# Patient Record
Sex: MALE | Race: WHITE | NOT HISPANIC OR LATINO | Employment: OTHER | URBAN - METROPOLITAN AREA
[De-identification: names, ages, dates, MRNs, and addresses within clinical notes are randomized per-mention and may not be internally consistent; named-entity substitution may affect disease eponyms.]

---

## 2017-09-25 ENCOUNTER — ALLSCRIPTS OFFICE VISIT (OUTPATIENT)
Dept: OTHER | Facility: OTHER | Age: 72
End: 2017-09-25

## 2017-10-27 NOTE — CONSULTS
Assessment  1  Colon cancer screening (V76 51) (Z12 11)   2  Chronic GERD (530 81) (K21 9)    Plan  Chronic GERD    · Suprep Bowel Prep Kit 17 5-3 13-1 6 GM/180ML Oral Solution; DILUTE CONTENTS  AND USE AS DIRECTED FOR BOWEL PREP   Rx By: Robles Saez; Dispense: 0 Days ; #:1 ML; Refill: 0;For: Chronic GERD; MIKI = N; Verified Transmission to [address] [place] [address] [address]; Last Updated By: System, [employer]; 9/25/2017 1:30:58 PM   · EGD; Status:Hold For - Scheduling; Requested VGM:[id number];    Perform:[facility]; Due:[id number];Ordered; For:Chronic GERD; Ordered By:Girish Duran;  Colon cancer screening    · COLONOSCOPY (GI, SURG); Status:Hold For - Scheduling; Requested TMN:[id number];    Perform:[facility]; Due:[id number]; Ordered; For:Colon cancer screening; Ordered By:Girish Duran;    Discussion/Summary  Discussion Summary:   1  History of GERD on omeprazole 20 mg, unsure if patient has history of Lang's or not, was referred for EGD by [facility]  no alarm symptoms  History of colon polyps: no alarm symptoms, we'll plan for repeat colonoscopy  Patient was explained about the risks and benefits of the procedure  Risks including but not limited to bleeding, infection, perforation were explained in detail  Also explained about less than 100% sensitivity with the exam and other alternatives  Chief Complaint  Chief Complaint Free Text Note Form: EGD and colonoscopy consult      History of Present Illness  HPI: This is a 23-year-old male with history of hypertension, GERD on omeprazole 20 mg who was referred from [facility] For EGD and colonoscopy  Patient states that he was recommended to have a repeat EGD and colonoscopy at 3 year interval  His loss procedures were 2014  He recalls having had history of polyps, does not recall if he has Lang's or not  He denies dysphagia, odynophagia, hematemesis, melena, hematochezia, unintentional weight loss or abdominal pain  He denies NSAID use   His only other medication is amlodipine  Review of Systems  Complete-Male GI Adult:   Constitutional: No fever or chills, feels well, no tiredness, no recent weight gain or weight loss  Eyes: No complaints of eye pain, no red eyes, no discharge from eyes, no itchy eyes  ENT: no complaints of earache, no hearing loss, no nosebleeds, no nasal discharge, no sore throat, no hoarseness  Cardiovascular: No complaints of slow heart rate, no fast heart rate, no chest pain, no palpitations, no leg claudication, no lower extremity  Respiratory: No complaints of shortness of breath, no wheezing, no cough, no SOB on exertion, no orthopnea or PND  Gastrointestinal: as noted in HPI  Genitourinary: No complaints of dysuria, no incontinence, no hesitancy, no nocturia, no genital lesion, no testicular pain  Musculoskeletal: No complaints of arthralgia, no myalgias, no joint swelling or stiffness, no limb pain or swelling  Integumentary: No complaints of skin rash or skin lesions, no itching, no skin wound, no dry skin  Neurological: No compliants of headache, no confusion, no convulsions, no numbness or tingling, no dizziness or fainting, no limb weakness, no difficulty walking  Psychiatric: Is not suicidal, no sleep disturbances, no anxiety or depression, no change in personality, no emotional problems  Endocrine: No complaints of proptosis, no hot flashes, no muscle weakness, no erectile dysfunction, no deepening of the voice, no feelings of weakness  Hematologic/Lymphatic: No complaints of swollen glands, no swollen glands in the neck, does not bleed easily, no easy bruising  ROS Reviewed:   ROS reviewed  Active Problems  1  Chronic GERD (530 81) (K21 9)   2  Colon cancer screening (V76 51) (Z12 11)    Past Medical History  Active Problems And Past Medical History Reviewed: The active problems and past medical history were reviewed and updated today  Surgical History  1   History of Complete Colonoscopy  Surgical History Reviewed: The surgical history was reviewed and updated today  Family History  Mother    1  Denied: Family history of Crohn's disease without complication, unspecified   gastrointestinal tract location   2  Denied: Family history of colon cancer   3  Denied: Family history of liver disease  Father    4  Denied: Family history of Crohn's disease without complication, unspecified   gastrointestinal tract location   5  Denied: Family history of colon cancer   6  Denied: Family history of liver disease  Family History Reviewed: The family history was reviewed and updated today  Social History   · Never smoker   · No alcohol use  Social History Reviewed: The social history was reviewed and updated today  Current Meds   1  AmLODIPine Besylate 5 MG Oral Tablet; Therapy: (Recorded:[id number]) to Recorded   2  Lisinopril 10 MG Oral Tablet; Therapy: (Vasquez Harvey) to Recorded   3  Omeprazole 20 MG Oral Capsule Delayed Release; Therapy: (Recorded:[id number]) to Recorded  Medication List Reviewed: The medication list was reviewed and updated today  Allergies  1  No Known Drug Allergies    Vitals  Vital Signs    Recorded: [id number] 01:24PM   Temperature 96 8 F   Heart Rate 76   Respiration 14   Systolic 148   Diastolic 86   Height 6 ft    Weight 185 lb 8 oz   BMI Calculated 25 16   BSA Calculated 2 06   O2 Saturation 96     Physical Exam    Constitutional   General appearance: No acute distress, well appearing and well nourished  Eyes   Conjunctiva and lids: No swelling, erythema, or discharge  Ears, Nose, Mouth, and Throat   Oropharynx: Normal with no erythema, edema, exudate or lesions  Pulmonary   Respiratory effort: No increased work of breathing or signs of respiratory distress  Auscultation of lungs: Clear to auscultation, equal breath sounds bilaterally, no wheezes, no rales, no rhonci      Cardiovascular   Palpation of heart: Normal PMI, no thrills  Auscultation of heart: Normal rate and rhythm, normal S1 and S2, without murmurs  Examination of extremities for edema and/or varicosities: Normal     Abdomen   Abdomen: Non-tender, no masses  Liver and spleen: No hepatomegaly or splenomegaly  Lymphatic   Palpation of lymph nodes in neck: No lymphadenopathy  Skin   Skin and subcutaneous tissue: Normal without rashes or lesions      Psychiatric   Orientation to person, place and time: Normal     Mood and affect: Normal          Signatures   Electronically signed by : Merlin Lindsay, M D ; Sep 25 2017  1:46PM EST                       (Author)

## 2017-11-14 ENCOUNTER — ANESTHESIA EVENT (OUTPATIENT)
Dept: PERIOP | Facility: AMBULARY SURGERY CENTER | Age: 72
End: 2017-11-14
Payer: OTHER GOVERNMENT

## 2017-11-21 ENCOUNTER — GENERIC CONVERSION - ENCOUNTER (OUTPATIENT)
Dept: OTHER | Facility: OTHER | Age: 72
End: 2017-11-21

## 2017-11-21 ENCOUNTER — HOSPITAL ENCOUNTER (OUTPATIENT)
Facility: AMBULARY SURGERY CENTER | Age: 72
Setting detail: OUTPATIENT SURGERY
Discharge: HOME | End: 2017-11-21
Attending: INTERNAL MEDICINE | Admitting: INTERNAL MEDICINE
Payer: OTHER GOVERNMENT

## 2017-11-21 ENCOUNTER — ANESTHESIA (OUTPATIENT)
Dept: PERIOP | Facility: AMBULARY SURGERY CENTER | Age: 72
End: 2017-11-21
Payer: OTHER GOVERNMENT

## 2017-11-21 VITALS
WEIGHT: 180 LBS | RESPIRATION RATE: 18 BRPM | HEIGHT: 72 IN | DIASTOLIC BLOOD PRESSURE: 85 MMHG | BODY MASS INDEX: 24.38 KG/M2 | SYSTOLIC BLOOD PRESSURE: 173 MMHG | OXYGEN SATURATION: 97 % | HEART RATE: 64 BPM | TEMPERATURE: 96.8 F

## 2017-11-21 DIAGNOSIS — Z12.11 ENCOUNTER FOR SCREENING FOR MALIGNANT NEOPLASM OF COLON: ICD-10-CM

## 2017-11-21 DIAGNOSIS — K21.9 CHRONIC GERD: ICD-10-CM

## 2017-11-21 PROCEDURE — 88342 IMHCHEM/IMCYTCHM 1ST ANTB: CPT | Performed by: INTERNAL MEDICINE

## 2017-11-21 PROCEDURE — 88305 TISSUE EXAM BY PATHOLOGIST: CPT | Performed by: INTERNAL MEDICINE

## 2017-11-21 PROCEDURE — 88341 IMHCHEM/IMCYTCHM EA ADD ANTB: CPT | Performed by: INTERNAL MEDICINE

## 2017-11-21 RX ORDER — AMLODIPINE BESYLATE 5 MG/1
5 TABLET ORAL DAILY
COMMUNITY

## 2017-11-21 RX ORDER — PROPOFOL 10 MG/ML
INJECTION, EMULSION INTRAVENOUS AS NEEDED
Status: DISCONTINUED | OUTPATIENT
Start: 2017-11-21 | End: 2017-11-21 | Stop reason: SURG

## 2017-11-21 RX ORDER — SODIUM CHLORIDE 9 MG/ML
100 INJECTION, SOLUTION INTRAVENOUS CONTINUOUS
Status: DISCONTINUED | OUTPATIENT
Start: 2017-11-21 | End: 2017-11-21 | Stop reason: HOSPADM

## 2017-11-21 RX ORDER — LISINOPRIL 10 MG/1
10 TABLET ORAL DAILY
COMMUNITY

## 2017-11-21 RX ORDER — OMEPRAZOLE 20 MG/1
20 CAPSULE, DELAYED RELEASE ORAL DAILY
COMMUNITY
End: 2021-03-02 | Stop reason: SDDI

## 2017-11-21 RX ORDER — LIDOCAINE HYDROCHLORIDE 10 MG/ML
INJECTION, SOLUTION INFILTRATION; PERINEURAL AS NEEDED
Status: DISCONTINUED | OUTPATIENT
Start: 2017-11-21 | End: 2017-11-21 | Stop reason: SURG

## 2017-11-21 RX ADMIN — PROPOFOL 100 MG: 10 INJECTION, EMULSION INTRAVENOUS at 07:37

## 2017-11-21 RX ADMIN — PROPOFOL 30 MG: 10 INJECTION, EMULSION INTRAVENOUS at 07:38

## 2017-11-21 RX ADMIN — PROPOFOL 30 MG: 10 INJECTION, EMULSION INTRAVENOUS at 07:49

## 2017-11-21 RX ADMIN — PROPOFOL 30 MG: 10 INJECTION, EMULSION INTRAVENOUS at 07:44

## 2017-11-21 RX ADMIN — PROPOFOL 20 MG: 10 INJECTION, EMULSION INTRAVENOUS at 08:03

## 2017-11-21 RX ADMIN — PROPOFOL 30 MG: 10 INJECTION, EMULSION INTRAVENOUS at 07:46

## 2017-11-21 RX ADMIN — LIDOCAINE HYDROCHLORIDE 50 MG: 10 INJECTION, SOLUTION INFILTRATION; PERINEURAL at 07:37

## 2017-11-21 RX ADMIN — PROPOFOL 30 MG: 10 INJECTION, EMULSION INTRAVENOUS at 08:01

## 2017-11-21 RX ADMIN — PROPOFOL 30 MG: 10 INJECTION, EMULSION INTRAVENOUS at 07:53

## 2017-11-21 RX ADMIN — SODIUM CHLORIDE 100 ML/HR: 9 INJECTION, SOLUTION INTRAVENOUS at 07:08

## 2017-11-21 RX ADMIN — PROPOFOL 20 MG: 10 INJECTION, EMULSION INTRAVENOUS at 07:58

## 2017-11-21 RX ADMIN — PROPOFOL 30 MG: 10 INJECTION, EMULSION INTRAVENOUS at 07:56

## 2017-11-21 RX ADMIN — PROPOFOL 20 MG: 10 INJECTION, EMULSION INTRAVENOUS at 08:05

## 2017-11-21 RX ADMIN — PROPOFOL 30 MG: 10 INJECTION, EMULSION INTRAVENOUS at 07:41

## 2017-11-21 NOTE — OP NOTE
COLONOSCOPY    PROCEDURE: Colonoscopy/ Biopsy    INDICATIONS: History of Colon Polyps    POST-OP DIAGNOSIS: See the impression below    SEDATION: Monitored anesthesia care, check anesthesia records    PHYSICAL EXAM:    /95   Pulse 68   Temp 97 8 °F (36 6 °C) (Temporal)   Resp 18   Ht 6' (1 829 m)   Wt 81 6 kg (180 lb)   SpO2 98%   BMI 24 41 kg/m²   Body mass index is 24 41 kg/m²  General: NAD  Heart: S1 & S2 normal, RRR  Lungs: CTA, No rales or rhonchi  Abdomen: Soft, nontender, nondistended, good bowel sounds    CONSENT:  Informed consent was obtained for the procedure, including sedation after explaining the risks and benefits of the procedure  Risks including but not limited to bleeding, perforation, infection, aspiration were discussed in detail  Also explained about less than 100%$ sensitivity with the exam and other alternatives  PREPARATION:   EKG tracing, pulse oximetry, blood pressure were monitored throughout the procedure  Patient was identified by myself both verbally and by visual inspection of ID band  DESCRIPTION:   Patient was placed in the left lateral decubitus position and was sedated with the above medication  Digital rectal examination was performed  The colonoscope was introduced in to the anal canal and advanced up to cecum, which was identified by the appendiceal orifice and IC valve  A careful inspection was made as the colonoscope was withdrawn, including a retroflexed view of the rectum; findings and interventions are described below  Appropriate photodocumentation was obtained  The quality of the colonic preparation was good  Start 7:54 a m  Cecum time 8:01 a m  End time 8:13 a m  FINDINGS:    1   6-7 mm sessile polyp noted in the ascending colon, removed using biopsy forceps  2   Three diminutive polyps noted in the descending colon, removed using biopsy forceps  3   1-2 mm colon polyp noted in the sigmoid colon, removed using biopsy forceps  4  Mild sigmoid diverticulosis  5   Retroflexion was performed and revealed small internal hemorrhoids  IMPRESSIONS:      1  Five colon polyps removed using biopsy forceps  2   Mild sigmoid diverticulosis  3   Small internal hemorrhoids  RECOMMENDATIONS:    1  Follow-up biopsy results in 2-3 weeks  2   High-fiber diet with at least 25-30 g daily  3   Discharge home once discharge parameters are met  COMPLICATIONS:  None; patient tolerated the procedure well      DISPOSITION: PACU           CONDITION: Stable

## 2017-11-21 NOTE — H&P
History and Physical -  [facility]  Mauricio Kaur 74 y o  male MRN: [id number]    HPI: Mauricio Kaur is a 74y o  year old male who presents for evaluation of longstanding GERD and history of polyps  Review of Systems    Historical Information   Past Medical History:   Diagnosis Date    GERD (gastroesophageal reflux disease)     Hypertension      Past Surgical History:   Procedure Laterality Date    NO PAST SURGERIES       Social History   History   Alcohol Use    Yes     Comment: 3-4 per week     History   Drug Use No     History   Smoking Status    Never Smoker   Smokeless Tobacco    Never Used     History reviewed  No pertinent family history  Meds/Allergies     Prescriptions Prior to Admission   Medication    amLODIPine (NORVASC) 5 mg tablet    lisinopril (ZESTRIL) 10 mg tablet    omeprazole (PriLOSEC) 20 mg delayed release capsule       No Known Allergies    Objective     Blood pressure 164/95, pulse 68, temperature 97 8 °F (36 6 °C), temperature source Temporal, resp  rate 18, height 6' (1 829 m), weight 81 6 kg (180 lb), SpO2 98 %  PHYSICAL EXAM    Gen: NAD  CV: RRR  CHEST: Clear  ABD: soft, NT/ND  EXT: no edema  Neuro: AAO      ASSESSMENT/PLAN:  This is a 74y o  year old male here for evaluation of longstanding history of GERD and history of polyps  PLAN:   Procedure:   EGD and colonoscopy

## 2017-11-21 NOTE — ANESTHESIA PREPROCEDURE EVALUATION
Review of Systems/Medical History  Patient summary reviewed  Chart reviewed  No history of anesthetic complications     Cardiovascular  Exercise tolerance: good,  Hypertension ,    Pulmonary  Negative pulmonary ROS No sleep apnea , ,        GI/Hepatic    GERD , Bowel prep       Negative  ROS        Endo/Other  Negative endo/other ROS      GYN  Negative gynecology ROS          Hematology  Negative hematology ROS      Musculoskeletal  Negative musculoskeletal ROS        Neurology  Negative neurology ROS      Psychology   Negative psychology ROS            Physical Exam    Airway    Mallampati score: I  TM Distance: >3 FB  Neck ROM: full     Dental   Comment: None loose; permanent implants,     Cardiovascular      Pulmonary      Other Findings        Anesthesia Plan  ASA Score- 2       Anesthesia Type- IV sedation with anesthesia with ASA Monitors  Additional Monitors:   Airway Plan:           Induction- intravenous  Informed Consent- Anesthetic plan and risks discussed with patient  I personally reviewed this patient with the CRNA  Discussed and agreed on the Anesthesia Plan with the CRNA  Martita Suresh

## 2017-11-21 NOTE — ANESTHESIA POSTPROCEDURE EVALUATION
Post-Op Assessment Note      CV Status:  Stable    Mental Status:  Alert    Hydration Status:  Stable and euvolemic    PONV Controlled:  None    Airway Patency:  Patent    Post Op Vitals Reviewed: Yes          Staff: CRNA       Comments: vss          BP   147/91   Temp      Pulse  57   Resp   16   SpO2   99

## 2017-11-21 NOTE — OP NOTE
ESOPHAGOGASTRODUODENOSCOPY    PROCEDURE: EGD/ Biopsy    INDICATIONS: GERD    POST-OP DIAGNOSIS: See the impression below    SEDATION: Monitored anesthesia care, check anesthesia records    PHYSICAL EXAM:    Vitals:    11/21/17 0658   BP: 164/95   Pulse: 68   Resp: 18   Temp:    SpO2: 98%    Body mass index is 24 41 kg/m²  General: NAD  Heart: S1 & S2 normal, RRR  Lungs: CTA, No rales or rhonchi  Abdomen: Soft, nontender, nondistended, good bowel sounds    CONSENT:  Informed consent was obtained for the procedure, including sedation after explaining the risks and benefits of the procedure  Risks including but not limited to bleeding, perforation, infection, aspiration were discussed in detail  Also explained about less than 100% sensitivity with the exam and other alternatives  PREPARATION:   EKG tracing, pulse oximetry, blood pressure were monitored throughout the procedure  Patient was identified by myself both verbally and by visual inspection of ID band  DESCRIPTION:   Patient was placed in the left lateral decubitus position and was sedated with the above medication  The gastroscope was introduced in to the oropharynx and the esophagus was intubated under direct visualization  Scope was passed down the esophagus up to 2nd part of the duodenum  A careful inspection was made as the gastroscope was withdrawn, including a retroflexed view of the stomach; findings and interventions are described below  FINDINGS:    #1  Esophagus and GEJ-irregular Z-line with 1 salmon-colored tongue noted extending 5 cm above the squamocolumnar junction, squamocolumnar junction was identified at 35 cm, proximal portion of the salmon-colored tongue was at 30 cm  Multiple biopsies were taken from 32 cm and 34-35 cm to assess for dysplasia  There was a 5 cm hiatal hernia below starting at 35-40 cm      #2  Stomach-mild erythema within the gastric antrum, suggestive of gastritis, biopsies taken from antrum and body to assess for H pylori  Retroflexion was performed and revealed hiatal hernia  #3  Duodenum-1 5 cm by 1 cm submucosal nodule noted in the duodenal sweep, multiple bowel biopsies were obtained  Duodenal bulb and D2 appeared unremarkable  IMPRESSIONS:      1  Long segment Lang's esophagus, starting from 30-35 cm  2   Large hiatal hernia measuring 5 cm  3   Mild antral gastritis  4   Large duodenal nodule, bowel biopsies taken  RECOMMENDATIONS:     1  Follow-up biopsy results in 2-3 weeks, will likely need endoscopic ultrasound to evaluate the duodenal nodule if well biopsies are unrevealing  2   Increase Prilosec to 40 mg daily  3   Avoid NSAID use  4   Anti reflux diet  5   Avoid fatty foods, chocolates, caffeine, alcohol and any other triggering foods  Avoid eating for at least 3 hours before going to bed  6   Discharge home once discharge parameters are met  COMPLICATIONS:  None; patient tolerated the procedure well            DISPOSITION: PACU           CONDITION: Stable

## 2017-12-18 ENCOUNTER — GENERIC CONVERSION - ENCOUNTER (OUTPATIENT)
Dept: OTHER | Facility: OTHER | Age: 72
End: 2017-12-18

## 2018-01-13 VITALS
DIASTOLIC BLOOD PRESSURE: 86 MMHG | HEIGHT: 72 IN | SYSTOLIC BLOOD PRESSURE: 144 MMHG | OXYGEN SATURATION: 96 % | RESPIRATION RATE: 14 BRPM | TEMPERATURE: 96.8 F | BODY MASS INDEX: 25.12 KG/M2 | HEART RATE: 76 BPM | WEIGHT: 185.5 LBS

## 2018-01-18 ENCOUNTER — GENERIC CONVERSION - ENCOUNTER (OUTPATIENT)
Dept: OTHER | Facility: OTHER | Age: 73
End: 2018-01-18

## 2018-01-23 NOTE — RESULT NOTES
Discussion/Summary   EGD shows gaston's, nodule in duodneal appears benign, however would recommend EUS for better evaluation  Please schedule this, repeat EGD in 1 year,    Colon with adenomatous polyps, repeat colon in 3 years   Verified Results  (1) TISSUE EXAM [id number] 07:41AM Cherise Galeazzi     Test Name Result Flag Reference   LAB AP CASE REPORT (Report)     Surgical Pathology Report             Case: [id number]                   Authorizing Provider: Rosemarie Quiroz MD    Collected:      11/21/2017 0741        Ordering Location:   [facility]    Received:      11/21/2017 [facility]                            Pathologist:      Christopher Weston MD                                 Specimens:  A) - Duodenum, Cold Bx Nodule duodenum                                 B) - Stomach, Cold Bx Gastric Body                                   C) - Esophagus, Cold Bx Distal Esophagus                                D) - Esophagus, Cold Bx Esophagus         E) - Large Intestine, Right/Ascending Colon, Cold Bx Ascending colon polyp               F) - Large Intestine, Left/Descending Colon, Cold Bx Descending colon polyp x3             G) - Large Intestine, Sigmoid Colon, Cold Bx Sigmoid colon polyp   LAB AP FINAL DIAGNOSIS (Report)     A  Nodular duodenum (biopsy):    - Small bowel mucosa with Brunner's gland hyperplasia and submucosal   smooth muscle (see comment)  - No villous atrophy or increased intraepithelial lymphocytes  Comment: Endoscopic / clinical correlation is required to ensure adequate   sampling of the reported nodule  B  Gastric body (biopsy):    - Gastric oxyntic and foveolar mucosa with no significant pathologic   abnormality     - Suggestion of medication (PPI) effect  - Immunostain for H  pylori (with appropriate positive control)   demonstrates no definitive Helicobacter      - No intestinal metaplasia, dysplasia or neoplasia identified  C  Distal esophagus (biopsy):    - Cardiac gastric and squamous junctional mucosa with intestinal   metaplasia (goblet cells present)  - Squamous eosinophils number up to 10-15 per HPF     - No dysplasia or malignancy identified  Comment: This biopsy shows gastric-type mucosa with scattered goblet   cells  The diagnosis in this case depends on the location of this biopsy  If this biopsy was taken from the tubular esophagus at least 1 cm above   the gastric folds, it represents Lang mucosa of the distinctive type  If this biopsy was taken from the gastric cardia, it represents intestinal   metaplasia of the gastric cardia  Reference: Mariella LEIVA; [employer] of   Gastroenterology  [facility] Clinical Guideline: Diagnosis and Management of   Lang's Esophagus  Am Sheeba Shear  2016 Jan;111(1)30-50  D  32 cm, esophagus (biopsy):    - Lang's mucosa  - No dysplasia or malignancy identified  Comment: The above diagnosis of Lang's esophagus is made due to the   presence of goblet cells (intestinal metaplasia) with the assumption that   the biopsies were obtained from columnar mucosa in the distal esophagus   located at least 1 cm proximal to the top of the gastric folds as per the   2016 [employer] of Gastroenterology [facility]) guidelines  Reference: Steffany Butler: [employer] of   Gastroenterology  [facility] Clinical Guideline: Diagnosis and Management of   Lang's Esophagus  Am Sheeba Dtime  2016 Jan;111(1): 30-50  E  Ascending colon polyp (cold biopsy):    - Portions of tubular adenoma  - No high-grade dysplasia or malignancy identified  F  Descending colon polyp ??3 (cold biopsy):    - Portions of polypoid colonic mucosa with lymphoid aggregate   formation     - Suggestion of melanosis coli     - No high-grade dysplasia or malignancy identified      G  Sigmoid colon polyp (cold biopsy):    - Polypoid colonic mucosa with suggestion of adenomatous epithelium     - No high-grade dysplasia or malignancy identified  Electronically signed by Linda Copeland MD on 11/27/2017 at 1:33 PM  Preliminary result electronically signed by Linda Copeland MD on 11/24/2017 at 12:29 PM   LAB AP MICROSCOPIC DESCRIPTION      - Immunohistochemical studies (with appropriate controls) demonstrate:    - Part A: Submucosal smooth muscle positive for SMA and desmin  S100,   DOG1 and  negative  This is an appended report  These results have been appended to a previously preliminary verified report  LAB AP NOTE      Interpretation performed at [facility], [address] [facility], [address] [facility]  Intradepartmental consultation concurs with the diagnosis  LAB AP SURGICAL ADDITIONAL INFORMATION (Report)     All controls performed with the immunohistochemical stains reported above   reacted appropriately  These tests were developed and their performance   characteristics determined by [facility] or   [facility]  They may not be cleared or approved by the [place]  Food and Drug Administration  The FDA has determined that such clearance   or approval is not necessary  These tests are used for clinical purposes  They should not be regarded as investigational or for research  This   laboratory has been approved by CLIA 88, designated as a high-complexity   laboratory and is qualified to perform these tests  LAB AP GROSS DESCRIPTION (Report)     A  The specimen is received in formalin, labeled with the patient's name   and hospital number, and is designated Cold biopsy nodule duodenum, are   multiple irregularly shaped fragments of tan-red soft tissue measuring 0 6   x 0 5 x 0 1 cm in aggregate  Entirely submitted  One cassette  B   The specimen is received in formalin, labeled with the patient's name   and hospital number, and is designated Cold biopsy gastric body, are   two irregularly shaped fragments of tan-red soft tissue measuring 0 5 and   0 3 cm in greatest dimension  Entirely submitted  One cassette  C  The specimen is received in formalin, labeled with the patient's name   and hospital number, and is designated Cold biopsy distal esophagus,   are three irregularly shaped fragments of tan soft tissue measuring 0 5 x   0 5 x 0 1 cm in aggregate  Entirely submitted  One cassette  D  The specimen is received in formalin, labeled with the patient's name   and hospital number, and is designated Cold biopsy esophagus at 32 cm,   are multiple irregularly shaped fragments of tan-red soft tissue measuring   0 5 x 0 5 x 0 1 cm in aggregate  Entirely submitted  One cassette  E  The specimen is received in formalin, labeled with the patient's name   and hospital number, and is designated Cold biopsy ascending colon   polyp, are multiple irregularly shaped fragments of tan soft tissue   measuring 0 5 x 0 5 x 0 1 cm in aggregate  Entirely submitted  One   cassette  F  The specimen is received in formalin, labeled with the patient's name   and hospital number, and is designated Cold biopsy descending colon   polyp ??3, are three irregularly shaped fragments of tan soft tissue each   measuring 0 3 cm in greatest dimension  Entirely submitted  One cassette  G  The specimen is received in formalin, labeled with the patient's name   and hospital number, and is designated Cold biopsy sigmoid colon polyp,   is a single irregularly shaped fragment of tan soft tissue measuring 0 3   cm in greatest dimension  Entirely submitted  One cassette  Note: The estimated total formalin fixation time based upon information   provided by the submitting clinician and the standard processing schedule   is less than 72 hours  [id number]   LAB AP CLINICAL INFORMATION      Encounter for screening for malignant neoplasm of colon  Chronic GERD         Plan  Chronic GERD    · U/S Endoscopic, limited to Esophagus; Status:Hold For - Scheduling; Requested  for:[id number];

## 2018-01-23 NOTE — MISCELLANEOUS
Message  GI Reminder Recall Jake Henriquez:   Date: 01/18/2018   Dear Golden Martines:     Review of our records shows you are due for the following: EUS  Please call the following office to schedule your appointment:   [facility], [place], [address] [place] [address] [phone]  We look forward to hearing from you!      Sincerely,     [address] [address]   Electronically signed by : Heydi Delong, ; Jan 18 2018 11:11AM EST                       (Author)

## 2018-01-31 ENCOUNTER — TELEPHONE (OUTPATIENT)
Dept: GASTROENTEROLOGY | Facility: AMBULARY SURGERY CENTER | Age: 73
End: 2018-01-31

## 2018-01-31 NOTE — TELEPHONE ENCOUNTER
Talked to patient's wife, patient will call in to schedule  Wife stated he recieved a bill for his Colonoscopy and not sure if he wants to schedule

## 2018-08-17 ENCOUNTER — TELEPHONE (OUTPATIENT)
Dept: GASTROENTEROLOGY | Facility: AMBULARY SURGERY CENTER | Age: 73
End: 2018-08-17

## 2018-10-15 ENCOUNTER — OFFICE VISIT (OUTPATIENT)
Dept: GASTROENTEROLOGY | Facility: AMBULARY SURGERY CENTER | Age: 73
End: 2018-10-15
Payer: COMMERCIAL

## 2018-10-15 ENCOUNTER — TELEPHONE (OUTPATIENT)
Dept: GASTROENTEROLOGY | Facility: CLINIC | Age: 73
End: 2018-10-15

## 2018-10-15 VITALS
SYSTOLIC BLOOD PRESSURE: 150 MMHG | WEIGHT: 181.4 LBS | TEMPERATURE: 97.8 F | BODY MASS INDEX: 24.57 KG/M2 | RESPIRATION RATE: 18 BRPM | HEART RATE: 94 BPM | DIASTOLIC BLOOD PRESSURE: 84 MMHG | HEIGHT: 72 IN

## 2018-10-15 DIAGNOSIS — Z86.010 HISTORY OF COLON POLYPS: ICD-10-CM

## 2018-10-15 DIAGNOSIS — K31.89 DUODENAL NODULE: ICD-10-CM

## 2018-10-15 DIAGNOSIS — K22.70 BARRETT'S ESOPHAGUS WITHOUT DYSPLASIA: Primary | ICD-10-CM

## 2018-10-15 PROCEDURE — 99214 OFFICE O/P EST MOD 30 MIN: CPT | Performed by: PHYSICIAN ASSISTANT

## 2018-10-15 NOTE — TELEPHONE ENCOUNTER
----- Message from [employer]ani [employer] sent at 10/15/2018  8:45 AM EDT -----  Regarding: EGD/EUS  Contact: [phone]  PLEASE CALL PT TO SCHEDULE EGD/EUS PER RAFIQ HO ORDER  THANK YOU!!!

## 2018-10-15 NOTE — LETTER
October 15, 2018     Baldomero Pacheco DO  [address] [facility]  [address]  [id number]    Patient: Zenon Gonzalez   YOB: 1945   Date of Visit: 10/15/2018       Dear Dr Juan Carlos Curtis:    Thank you for referring Zenon Gonzalez to me for evaluation  Below are my notes for this consultation  If you have questions, please do not hesitate to call me  I look forward to following your patient along with you  Sincerely,        Jo Saleh PA-C        CC: No Recipients  Jo Saleh PA-C  10/15/2018  8:49 AM  Sign at close encounter  Assessment and Plan    #1  Long segment Lang's esophagus: doing well, no weight loss, dysphagia or heartburn  On Prilosec once daily  -Continue PPI daily  -Anti-reflux measures and diet  -Due for repeat EGD, will schedule this  #2  Duodenal nodule: appeared benign on biopsy last year but was recommended to have an EUS which was not done  -Plan for EUS in conjunction with EGD  -Discussed procedure, risks, and benefits with patient     #3  History of colon polyps  -Due for repeat colonoscopy in 2020  --------------------------------------------------------------------------------------------------------------------    Chief Complaint: f/u Lang's esophagus    HPI: Zenon Gonzalez is a 74 y o  male who presents today for follow up for history of Lang's esophagus  Patient was seen last year and had upper endoscopy performed in November 2017  He has long segment Lang's from 30 to 35 cm with a large hiatal hernia, gastritis, and duodenal nodule that was biopsied  Biopsies came back benign for the duodenal nodule however it was recommended he have an endoscopic ultrasound due to the size but this was never performed  He also had a colonoscopy at that time in which she had a few polyps removed  The patient is currently on omeprazole once daily  He denies any significant acid reflux symptoms, dysphagia, nausea, or vomiting      Patient denies any abdominal pain, unexpected weight loss, diarrhea, constipation, blood in stool, or black tarry stools  He is due for repeat EGD currently and is due for repeat colonoscopy in 2020  Review of Systems:   General: negative for fatigue, fever, night sweats or unexpected weight loss  Psychological: negative for anxiety or depression  Ophthalmic: negative for blurry vision or scleral icterus  ENT: negative for headaches, oral lesions, sore throat, vocal changes or dysphagia  Hematological and Lymphatic: negative for pallor or swollen lymph nodes  Respiratory: negative for cough, shortness of breath or wheezing  Cardiovascular: negative for chest pain, edema or murmur  Gastrointestinal: as mentioned in HPI  Genito-Urinary: negative for dysuria or incontinence  Musculoskeletal: negative for joint pain, joint stiffness or joint swelling  Dermatological: negative for pruritus, rash, or jaundice    Current Medications  Current Outpatient Prescriptions   Medication Sig Dispense Refill    amLODIPine (NORVASC) 5 mg tablet Take 5 mg by mouth daily      lisinopril (ZESTRIL) 10 mg tablet Take 10 mg by mouth daily      omeprazole (PriLOSEC) 20 mg delayed release capsule Take 20 mg by mouth every other day       No current facility-administered medications for this visit  Past Medical History  Past Medical History:   Diagnosis Date    GERD (gastroesophageal reflux disease)     Hypertension        Past Surgical History  Past Surgical History:   Procedure Laterality Date    NO PAST SURGERIES      [place] COLONOSCOPY FLX DX W/COLLJ SPEC WHEN PFRMD N/A 11/21/2017    Procedure: EGD AND COLONOSCOPY;  Surgeon: Yolanda David MD;  [facility];   Service: Gastroenterology       Past Social History   Social History     Social History    Marital status: /Civil Union     Spouse name: N/A    Number of children: N/A    Years of education: N/A     Social History Main Topics    Smoking status: Never Smoker    Smokeless tobacco: Never Used    Alcohol use Yes      Comment: 3-4 per week    Drug use: No    Sexual activity: Not Asked     Other Topics Concern    None     Social History Narrative    None       The following portions of the patient's history were reviewed and updated as appropriate: allergies, current medications, past family history, past medical history, past social history, past surgical history and problem list     Vital Signs  Vitals:    10/15/18 0828   BP: 150/84   BP Location: Right arm   Patient Position: Sitting   Cuff Size: Standard   Pulse: 94   Resp: 18   Temp: 97 8 °F (36 6 °C)   TempSrc: Tympanic   Weight: 82 3 kg (181 lb 6 4 oz)   Height: 6' (1 829 m)       Physical Exam:  General appearance: alert, cooperative, no distress  HEENT: normocephalic, anicteric, no eye erythema or discharge, no oropharyngeal thrush  Neck: supple, trachea midline, no adenopathy  Lungs: CTA b/l, no rales, rhonchi, or wheezing, unlabored respirations  Heart: RRR, no murmur, rubs, or gallops  Abdomen: soft, non-tender, non-distended, normal bowel sounds, no masses or organomegaly  Rectal: deferred  Extremities: no cyanosis, clubbing, or edema  Musculoskeletal: normal gait  Skin: color and texture normal, no jaundice, no rashes or lesions  Psychiatric: alert and oriented, normal affect and behavior

## 2018-10-15 NOTE — TELEPHONE ENCOUNTER
EGD/EUS scheduled with Dr Ragsdale in [facility] on 10/18/2018  I gave pt verbal instructions/emailed to [email or web address]

## 2018-10-15 NOTE — PROGRESS NOTES
Assessment and Plan    #1  Long segment Lang's esophagus: doing well, no weight loss, dysphagia or heartburn  On Prilosec once daily  -Continue PPI daily  -Anti-reflux measures and diet  -Due for repeat EGD, will schedule this  #2  Duodenal nodule: appeared benign on biopsy last year but was recommended to have an EUS which was not done  -Plan for EUS in conjunction with EGD  -Discussed procedure, risks, and benefits with patient     #3  History of colon polyps  -Due for repeat colonoscopy in 2020  --------------------------------------------------------------------------------------------------------------------    Chief Complaint: f/u Lang's esophagus    HPI: Rissa Mckeon is a 74 y o  male who presents today for follow up for history of Lang's esophagus  Patient was seen last year and had upper endoscopy performed in November 2017  He has long segment Lang's from 30 to 35 cm with a large hiatal hernia, gastritis, and duodenal nodule that was biopsied  Biopsies came back benign for the duodenal nodule however it was recommended he have an endoscopic ultrasound due to the size but this was never performed  He also had a colonoscopy at that time in which she had a few polyps removed  The patient is currently on omeprazole once daily  He denies any significant acid reflux symptoms, dysphagia, nausea, or vomiting  Patient denies any abdominal pain, unexpected weight loss, diarrhea, constipation, blood in stool, or black tarry stools  He is due for repeat EGD currently and is due for repeat colonoscopy in 2020      Review of Systems:   General: negative for fatigue, fever, night sweats or unexpected weight loss  Psychological: negative for anxiety or depression  Ophthalmic: negative for blurry vision or scleral icterus  ENT: negative for headaches, oral lesions, sore throat, vocal changes or dysphagia  Hematological and Lymphatic: negative for pallor or swollen lymph nodes  Respiratory: negative for cough, shortness of breath or wheezing  Cardiovascular: negative for chest pain, edema or murmur  Gastrointestinal: as mentioned in HPI  Genito-Urinary: negative for dysuria or incontinence  Musculoskeletal: negative for joint pain, joint stiffness or joint swelling  Dermatological: negative for pruritus, rash, or jaundice    Current Medications  Current Outpatient Prescriptions   Medication Sig Dispense Refill    amLODIPine (NORVASC) 5 mg tablet Take 5 mg by mouth daily      lisinopril (ZESTRIL) 10 mg tablet Take 10 mg by mouth daily      omeprazole (PriLOSEC) 20 mg delayed release capsule Take 20 mg by mouth every other day       No current facility-administered medications for this visit  Past Medical History  Past Medical History:   Diagnosis Date    GERD (gastroesophageal reflux disease)     Hypertension        Past Surgical History  Past Surgical History:   Procedure Laterality Date    NO PAST SURGERIES      [place] COLONOSCOPY FLX DX W/COLLJ SPEC WHEN PFRMD N/A 11/21/2017    Procedure: EGD AND COLONOSCOPY;  Surgeon: Echo Burt MD;  Location[facility];   Service: Gastroenterology       Past Social History   Social History     Social History    Marital status: /Civil Union     Spouse name: N/A    Number of children: N/A    Years of education: N/A     Social History Main Topics    Smoking status: Never Smoker    Smokeless tobacco: Never Used    Alcohol use Yes      Comment: 3-4 per week    Drug use: No    Sexual activity: Not Asked     Other Topics Concern    None     Social History Narrative    None       The following portions of the patient's history were reviewed and updated as appropriate: allergies, current medications, past family history, past medical history, past social history, past surgical history and problem list     Vital Signs  Vitals:    10/15/18 0828   BP: 150/84   BP Location: Right arm   Patient Position: Sitting   Cuff Size: Standard   Pulse: 94   Resp: 18   Temp: 97 8 °F (36 6 °C)   TempSrc: Tympanic   Weight: 82 3 kg (181 lb 6 4 oz)   Height: 6' (1 829 m)       Physical Exam:  General appearance: alert, cooperative, no distress  HEENT: normocephalic, anicteric, no eye erythema or discharge, no oropharyngeal thrush  Neck: supple, trachea midline, no adenopathy  Lungs: CTA b/l, no rales, rhonchi, or wheezing, unlabored respirations  Heart: RRR, no murmur, rubs, or gallops  Abdomen: soft, non-tender, non-distended, normal bowel sounds, no masses or organomegaly  Rectal: deferred  Extremities: no cyanosis, clubbing, or edema  Musculoskeletal: normal gait  Skin: color and texture normal, no jaundice, no rashes or lesions  Psychiatric: alert and oriented, normal affect and behavior

## 2018-10-17 ENCOUNTER — TELEPHONE (OUTPATIENT)
Dept: GASTROENTEROLOGY | Facility: CLINIC | Age: 73
End: 2018-10-17

## 2018-10-18 ENCOUNTER — ANESTHESIA EVENT (OUTPATIENT)
Dept: GASTROENTEROLOGY | Facility: HOSPITAL | Age: 73
End: 2018-10-18
Payer: OTHER GOVERNMENT

## 2018-10-18 ENCOUNTER — ANESTHESIA (OUTPATIENT)
Dept: GASTROENTEROLOGY | Facility: HOSPITAL | Age: 73
End: 2018-10-18
Payer: OTHER GOVERNMENT

## 2018-10-18 ENCOUNTER — HOSPITAL ENCOUNTER (OUTPATIENT)
Facility: HOSPITAL | Age: 73
Setting detail: OUTPATIENT SURGERY
Discharge: HOME | End: 2018-10-18
Attending: INTERNAL MEDICINE | Admitting: INTERNAL MEDICINE
Payer: OTHER GOVERNMENT

## 2018-10-18 VITALS
HEART RATE: 60 BPM | SYSTOLIC BLOOD PRESSURE: 130 MMHG | TEMPERATURE: 97.5 F | OXYGEN SATURATION: 96 % | RESPIRATION RATE: 20 BRPM | DIASTOLIC BLOOD PRESSURE: 79 MMHG | BODY MASS INDEX: 24.52 KG/M2 | WEIGHT: 181 LBS | HEIGHT: 72 IN

## 2018-10-18 DIAGNOSIS — K31.89 DUODENAL NODULE: ICD-10-CM

## 2018-10-18 DIAGNOSIS — K22.70 BARRETT'S ESOPHAGUS WITHOUT DYSPLASIA: ICD-10-CM

## 2018-10-18 PROCEDURE — 88305 TISSUE EXAM BY PATHOLOGIST: CPT | Performed by: PATHOLOGY

## 2018-10-18 PROCEDURE — 43239 EGD BIOPSY SINGLE/MULTIPLE: CPT | Performed by: INTERNAL MEDICINE

## 2018-10-18 PROCEDURE — 43237 ENDOSCOPIC US EXAM ESOPH: CPT | Performed by: INTERNAL MEDICINE

## 2018-10-18 PROCEDURE — 88305 TISSUE EXAM BY PATHOLOGIST: CPT | Performed by: INTERNAL MEDICINE

## 2018-10-18 PROCEDURE — 88361 TUMOR IMMUNOHISTOCHEM/COMPUT: CPT

## 2018-10-18 PROCEDURE — 88312 SPECIAL STAINS GROUP 1: CPT

## 2018-10-18 RX ORDER — LIDOCAINE HYDROCHLORIDE 10 MG/ML
0.5 INJECTION, SOLUTION EPIDURAL; INFILTRATION; INTRACAUDAL; PERINEURAL ONCE AS NEEDED
Status: DISCONTINUED | OUTPATIENT
Start: 2018-10-18 | End: 2018-10-18 | Stop reason: HOSPADM

## 2018-10-18 RX ORDER — PROPOFOL 10 MG/ML
INJECTION, EMULSION INTRAVENOUS CONTINUOUS PRN
Status: DISCONTINUED | OUTPATIENT
Start: 2018-10-18 | End: 2018-10-18 | Stop reason: SURG

## 2018-10-18 RX ORDER — PROPOFOL 10 MG/ML
INJECTION, EMULSION INTRAVENOUS AS NEEDED
Status: DISCONTINUED | OUTPATIENT
Start: 2018-10-18 | End: 2018-10-18 | Stop reason: SURG

## 2018-10-18 RX ORDER — SODIUM CHLORIDE 9 MG/ML
50 INJECTION, SOLUTION INTRAVENOUS CONTINUOUS
Status: DISCONTINUED | OUTPATIENT
Start: 2018-10-18 | End: 2018-10-18 | Stop reason: HOSPADM

## 2018-10-18 RX ADMIN — PROPOFOL 120 MCG/KG/MIN: 10 INJECTION, EMULSION INTRAVENOUS at 09:28

## 2018-10-18 RX ADMIN — SODIUM CHLORIDE 50 ML/HR: 0.9 INJECTION, SOLUTION INTRAVENOUS at 07:55

## 2018-10-18 RX ADMIN — LIDOCAINE HYDROCHLORIDE 100 MG: 20 INJECTION, SOLUTION INTRAVENOUS at 09:23

## 2018-10-18 RX ADMIN — PROPOFOL 50 MG: 10 INJECTION, EMULSION INTRAVENOUS at 09:30

## 2018-10-18 RX ADMIN — PROPOFOL 50 MG: 10 INJECTION, EMULSION INTRAVENOUS at 09:27

## 2018-10-18 NOTE — ANESTHESIA PREPROCEDURE EVALUATION
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 without cardiopulmonary limitation Hypertension ,    Pulmonary  Negative pulmonary ROS Not a smoker ,        GI/Hepatic    GERD well controlled, Esophageal disease gaston esophagus,   Comment: Duodenal nodule     Negative  ROS        Endo/Other  Negative endo/other ROS      GYN       Hematology  Negative hematology ROS      Musculoskeletal  Negative musculoskeletal ROS        Neurology  Negative neurology ROS      Psychology           Physical Exam    Airway    Mallampati score: I  TM Distance: >3 FB  Neck ROM: full     Dental   Comment: No loose teeth, No notable dental hx     Cardiovascular  Rhythm: regular, No murmur,     Pulmonary  Breath sounds clear to auscultation,     Other Findings        Anesthesia Plan  ASA Score- 2     Anesthesia Type- IV sedation with anesthesia with ASA Monitors  Additional Monitors:   Airway Plan:         Plan Factors-    Induction- intravenous  Postoperative Plan-     Informed Consent- Anesthetic plan and risks discussed with patient

## 2018-10-18 NOTE — H&P
History and Physical - [facility]  Rome Patel 74 y o  male MRN: [id number]    HPI: Rome Patel is a 74y o  year old male who presents with history of duodenal nodule and Lang's esophagus  Plan for EGD and EUS  Review of Systems    Historical Information   Past Medical History:   Diagnosis Date    GERD (gastroesophageal reflux disease)     Hypertension      Past Surgical History:   Procedure Laterality Date    NO PAST SURGERIES      [place] COLONOSCOPY FLX DX W/COLLJ SPEC WHEN PFRMD N/A 11/21/2017    Procedure: EGD AND COLONOSCOPY;  Surgeon: Leodan Naik MD;  Location[facility]; Service: Gastroenterology     Social History   History   Alcohol Use    Yes     Comment: 3-4 per week     History   Drug Use No     History   Smoking Status    Never Smoker   Smokeless Tobacco    Never Used     No family history on file  Meds/Allergies     Prescriptions Prior to Admission   Medication    amLODIPine (NORVASC) 5 mg tablet    lisinopril (ZESTRIL) 10 mg tablet    omeprazole (PriLOSEC) 20 mg delayed release capsule       No Known Allergies    Objective     There were no vitals taken for this visit  PHYSICAL EXAM    Gen: NAD  CV: RRR  CHEST: Clear  ABD: soft, NT/ND  EXT: no edema  Neuro: AAO      ASSESSMENT/PLAN:  This is a 74y o  year old male here for EGD for Lang's esophagus and EUS for duodenal nodule  PLAN:   Procedure:  EGD and EUS

## 2018-10-18 NOTE — ANESTHESIA POSTPROCEDURE EVALUATION
Post-Op Assessment Note      CV Status:  Stable    Mental Status:  Awake and lethargic    Hydration Status:  Euvolemic    PONV Controlled:  Controlled    Airway Patency:  Patent    Post Op Vitals Reviewed: Yes          Staff: CRNA       Comments: skin pink warm and dry still sleepy          /66 (10/18/18 1016)    Temp      Pulse 56 (10/18/18 1016)   Resp 18 (10/18/18 1016)    SpO2 98 % (10/18/18 1016)

## 2018-10-18 NOTE — OP NOTE
OPERATIVE REPORT  PATIENT NAME: Hammad Matos    :  1945  MRN: [id number]  Pt Location: [facility] ROOM 04    SURGERY DATE: 10/18/2018    Surgeon(s) and Role:     Rona Sanches MD - Primary    Preop Diagnosis:  Duodenal nodule [K31 89]  Lang's esophagus without dysplasia [K22 70]    Post-Op Diagnosis Codes:     * Duodenal nodule [K31 89]     * Lang's esophagus without dysplasia [K22 70]    Procedure(s) (LRB):  LINEAR ENDOSCOPIC U/S (N/A)  ESOPHAGOGASTRODUODENOSCOPY (EGD) (N/A)    Specimen(s):  ID Type Source Tests Collected by Time Destination   1 : Espohagus at 35 cm-cold bx Tissue Esophagus TISSUE EXAM Marilia Penn MD 10/18/2018 0959    2 : Esophagus at 33 cm-cold bx Tissue Esophagus TISSUE EXAM Marilia Penn MD 10/18/2018 1002    3 : Esophagus at 32 cm-cold bx Tissue Esophagus TISSUE EXAM Marilia Penn MD 10/18/2018 1002      ENDOSCOPIC ULTRASOUND    SEDATION: Monitored anesthesia care, check anesthesia records    INDICATIONS:  Duodenal submucosal nodule  CONSENT:  Informed consent was obtained for the procedure, including sedation after explaining the risks and benefits of the procedure  Risks including but not limited to bleeding, perforation, infection, and missed lesion  PREPARATION:   Telemetry, pulse oximetry, blood pressure were monitored throughout the procedure  Patient was identified by myself both verbally and by visual inspection of ID band  DESCRIPTION:   Patient was placed in the left lateral decubitus position and was sedated with the above medication  The gastroscope was introduced in to the oropharynx and the esophagus was intubated under direct visualization  Scope was passed down the esophagus up to 2nd part of the duodenum  A careful inspection was made as the gastroscope was withdrawn, including a retroflexed view of the stomach; findings and interventions are described below       FINDINGS:    EGD FINDINGS:  Limited endoscopic view with oblique viewing echoendoscope was unremarkable  #1  Esophagus- there was evidence of Lang's esophagus spanning from 30 cm to 35 cm  It was circumferential for 2 cm and a time-out was seen extending for 3 cm proximally  Biopsies were done at 31 cm, 33 cm and 35 cm  A WATS 3D brush sample was done as well  A large 7cm hiatal hernia was seen  #2  Stomach- the stomach appeared to be normal   A hiatal hernia was seen  #3  Duodenum- the duodenal bulb was normal   At the apex there was a 1 cm submucosal lesion seen  This was further evaluated by endoscopic ultrasound as listed below  The 2nd portion of the duodenum was normal     EUS FINDINGS:    Duodenal nodule  A hypoechoic area was seen in the duodenal bulb/apex  This measured 10mmx5 mm  It was hypoechoic and arising from the muscularis propria  No surrounding lymphadenopathy was seen  FNA could not be done due to the small size of the lesion  EMR could not be done due to the deep location  Pancreas  The pancreas had fatty infiltration  Otherwise normal   Biliary system  The gallbladder had multiple hyperechoic stone seen within it  The bile duct was normal   Celiac axis  The celiac axis was normal and no lymphadenopathy was seen  Liver  The visualized areas of the liver appeared to be unremarkable  IMPRESSIONS:      1  Submucosal duodenal 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 which was hypoechoic and arising from the muscularis propria  This measured [id number]  FNA could not be done due to the small size and EMR could not be done due to the deep location  2  Lang's esophagus  C2M5   Biopsies were done  WATS 3D brush was done  3  Large Hiatal hernia  RECOMMENDATIONS:     1  For small just such as that I would recommend baseline CT imaging as well as a surgical Oncology evaluation  However would consider annual follow-up with EGD/EUS  2  Follow up biopsy results for the Lang's esophagus  Continue PPI       COMPLICATIONS:  None; patient tolerated the procedure well            DISPOSITION: PACU           CONDITION: Stable

## 2018-10-18 NOTE — DISCHARGE INSTR - AVS FIRST PAGE
OPERATIVE REPORT  PATIENT NAME: Bryson Chan    :  1945  MRN: [id number]  Pt Location:  GI ROOM 04    SURGERY DATE: 10/18/2018    Surgeon(s) and Role:     Lilibeth North MD - Primary    Preop Diagnosis:  Duodenal nodule [K31 89]  Lang's esophagus without dysplasia [K22 70]    Post-Op Diagnosis Codes:     * Duodenal nodule [K31 89]     * Lang's esophagus without dysplasia [K22 70]    Procedure(s) (LRB):  LINEAR ENDOSCOPIC U/S (N/A)  ESOPHAGOGASTRODUODENOSCOPY (EGD) (N/A)    Specimen(s):  ID Type Source Tests Collected by Time Destination   1 : Espohagus at 35 cm-cold bx Tissue Esophagus TISSUE EXAM Alyx Woods MD 10/18/2018 0959    2 : Esophagus at 33 cm-cold bx Tissue Esophagus TISSUE EXAM Alyx Woods MD 10/18/2018 1002    3 : Esophagus at 32 cm-cold bx Tissue Esophagus TISSUE EXAM Alyx Woods MD 10/18/2018 1002      ENDOSCOPIC ULTRASOUND    SEDATION: Monitored anesthesia care, check anesthesia records    INDICATIONS:  Duodenal submucosal nodule  CONSENT:  Informed consent was obtained for the procedure, including sedation after explaining the risks and benefits of the procedure  Risks including but not limited to bleeding, perforation, infection, and missed lesion  PREPARATION:   Telemetry, pulse oximetry, blood pressure were monitored throughout the procedure  Patient was identified by myself both verbally and by visual inspection of ID band  DESCRIPTION:   Patient was placed in the left lateral decubitus position and was sedated with the above medication  The gastroscope was introduced in to the oropharynx and the esophagus was intubated under direct visualization  Scope was passed down the esophagus up to 2nd part of the duodenum  A careful inspection was made as the gastroscope was withdrawn, including a retroflexed view of the stomach; findings and interventions are described below       FINDINGS:    EGD FINDINGS:  Limited endoscopic view with oblique viewing echoendoscope was unremarkable  #1  Esophagus- there was evidence of Lang's esophagus spanning from 30 cm to 35 cm  It was circumferential for 2 cm and a time-out was seen extending for 3 cm proximally  Biopsies were done at 31 cm, 33 cm and 35 cm  A WATS 3D brush sample was done as well  A large 7cm hiatal hernia was seen  #2  Stomach- the stomach appeared to be normal   A hiatal hernia was seen  #3  Duodenum- the duodenal bulb was normal   At the apex there was a 1 cm submucosal lesion seen  This was further evaluated by endoscopic ultrasound as listed below  The 2nd portion of the duodenum was normal     EUS FINDINGS:    Duodenal nodule  A hypoechoic area was seen in the duodenal bulb/apex  This measured 10mmx5 mm  It was hypoechoic and arising from the muscularis propria  No surrounding lymphadenopathy was seen  FNA could not be done due to the small size of the lesion  EMR could not be done due to the deep location  Pancreas  The pancreas had fatty infiltration  Otherwise normal   Biliary system  The gallbladder had multiple hyperechoic stone seen within it  The bile duct was normal   Celiac axis  The celiac axis was normal and no lymphadenopathy was seen  Liver  The visualized areas of the liver appeared to be unremarkable  IMPRESSIONS:      1  Submucosal duodenal 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 which was hypoechoic and arising from the muscularis propria  This measured [id number]  FNA could not be done due to the small size and EMR could not be done due to the deep location  2  Lang's esophagus  C2M5   Biopsies were done  WATS 3D brush was done  3  Large Hiatal hernia  RECOMMENDATIONS:     1  For small just such as that I would recommend baseline CT imaging as well as a surgical Oncology evaluation  However would consider annual follow-up with EGD/EUS  2  Follow up biopsy results for the Lang's esophagus  Continue PPI       COMPLICATIONS:  None; patient tolerated the procedure well            DISPOSITION: PACU           CONDITION: Stable

## 2019-04-18 LAB — MISCELLANEOUS LAB TEST RESULT: NORMAL

## 2021-01-10 ENCOUNTER — HOSPITAL ENCOUNTER (INPATIENT)
Facility: HOSPITAL | Age: 76
LOS: 4 days | Discharge: HOME | DRG: 392 | End: 2021-01-14
Attending: EMERGENCY MEDICINE | Admitting: SURGERY
Payer: COMMERCIAL

## 2021-01-10 ENCOUNTER — APPOINTMENT (EMERGENCY)
Dept: CT IMAGING | Facility: HOSPITAL | Age: 76
DRG: 392 | End: 2021-01-10
Payer: COMMERCIAL

## 2021-01-10 ENCOUNTER — APPOINTMENT (EMERGENCY)
Dept: RADIOLOGY | Facility: HOSPITAL | Age: 76
DRG: 392 | End: 2021-01-10
Payer: COMMERCIAL

## 2021-01-10 DIAGNOSIS — N17.9 AKI (ACUTE KIDNEY INJURY) (HCC): ICD-10-CM

## 2021-01-10 DIAGNOSIS — E86.0 DEHYDRATION: ICD-10-CM

## 2021-01-10 DIAGNOSIS — K44.9 HIATAL HERNIA: ICD-10-CM

## 2021-01-10 DIAGNOSIS — K31.1 GASTRIC OUTLET OBSTRUCTION: Primary | ICD-10-CM

## 2021-01-10 LAB
ALBUMIN SERPL BCP-MCNC: 3.7 G/DL (ref 3.5–5)
ALP SERPL-CCNC: 83 U/L (ref 46–116)
ALT SERPL W P-5'-P-CCNC: 22 U/L (ref 12–78)
ANION GAP SERPL CALCULATED.3IONS-SCNC: 7 MMOL/L (ref 4–13)
AST SERPL W P-5'-P-CCNC: 16 U/L (ref 5–45)
BASOPHILS # BLD AUTO: 0.02 THOUSANDS/ΜL (ref 0–0.1)
BASOPHILS NFR BLD AUTO: 0 % (ref 0–1)
BILIRUB DIRECT SERPL-MCNC: 0.13 MG/DL (ref 0–0.2)
BILIRUB SERPL-MCNC: 0.53 MG/DL (ref 0.2–1)
BUN SERPL-MCNC: 32 MG/DL (ref 5–25)
CALCIUM SERPL-MCNC: 9 MG/DL (ref 8.3–10.1)
CHLORIDE SERPL-SCNC: 104 MMOL/L (ref 100–108)
CO2 SERPL-SCNC: 35 MMOL/L (ref 21–32)
CREAT SERPL-MCNC: 2.31 MG/DL (ref 0.6–1.3)
EOSINOPHIL # BLD AUTO: 0 THOUSAND/ΜL (ref 0–0.61)
EOSINOPHIL NFR BLD AUTO: 0 % (ref 0–6)
ERYTHROCYTE [DISTWIDTH] IN BLOOD BY AUTOMATED COUNT: 14.5 % (ref 11.6–15.1)
FLUAV RNA RESP QL NAA+PROBE: NEGATIVE
FLUBV RNA RESP QL NAA+PROBE: NEGATIVE
GFR SERPL CREATININE-BSD FRML MDRD: 26 ML/MIN/1.73SQ M
GLUCOSE SERPL-MCNC: 167 MG/DL (ref 65–140)
HCT VFR BLD AUTO: 46.7 % (ref 36.5–49.3)
HGB BLD-MCNC: 15 G/DL (ref 12–17)
IMM GRANULOCYTES # BLD AUTO: 0.09 THOUSAND/UL (ref 0–0.2)
IMM GRANULOCYTES NFR BLD AUTO: 1 % (ref 0–2)
LACTATE SERPL-SCNC: 2.1 MMOL/L (ref 0.5–2)
LIPASE SERPL-CCNC: 217 U/L (ref 73–393)
LYMPHOCYTES # BLD AUTO: 0.91 THOUSANDS/ΜL (ref 0.6–4.47)
LYMPHOCYTES NFR BLD AUTO: 6 % (ref 14–44)
MAGNESIUM SERPL-MCNC: 2.4 MG/DL (ref 1.6–2.6)
MCH RBC QN AUTO: 29.2 PG (ref 26.8–34.3)
MCHC RBC AUTO-ENTMCNC: 32.1 G/DL (ref 31.4–37.4)
MCV RBC AUTO: 91 FL (ref 82–98)
MONOCYTES # BLD AUTO: 0.92 THOUSAND/ΜL (ref 0.17–1.22)
MONOCYTES NFR BLD AUTO: 6 % (ref 4–12)
NEUTROPHILS # BLD AUTO: 14.06 THOUSANDS/ΜL (ref 1.85–7.62)
NEUTS SEG NFR BLD AUTO: 87 % (ref 43–75)
NRBC BLD AUTO-RTO: 0 /100 WBCS
NT-PROBNP SERPL-MCNC: 786 PG/ML
PLATELET # BLD AUTO: 288 THOUSANDS/UL (ref 149–390)
PMV BLD AUTO: 12 FL (ref 8.9–12.7)
POTASSIUM SERPL-SCNC: 3.7 MMOL/L (ref 3.5–5.3)
PROT SERPL-MCNC: 8.7 G/DL (ref 6.4–8.2)
RBC # BLD AUTO: 5.13 MILLION/UL (ref 3.88–5.62)
RSV RNA RESP QL NAA+PROBE: NEGATIVE
SARS-COV-2 RNA RESP QL NAA+PROBE: NEGATIVE
SODIUM SERPL-SCNC: 146 MMOL/L (ref 136–145)
TROPONIN I SERPL-MCNC: <0.02 NG/ML
WBC # BLD AUTO: 16 THOUSAND/UL (ref 4.31–10.16)

## 2021-01-10 PROCEDURE — 36415 COLL VENOUS BLD VENIPUNCTURE: CPT | Performed by: EMERGENCY MEDICINE

## 2021-01-10 PROCEDURE — 83880 ASSAY OF NATRIURETIC PEPTIDE: CPT | Performed by: EMERGENCY MEDICINE

## 2021-01-10 PROCEDURE — 80048 BASIC METABOLIC PNL TOTAL CA: CPT | Performed by: EMERGENCY MEDICINE

## 2021-01-10 PROCEDURE — 84484 ASSAY OF TROPONIN QUANT: CPT | Performed by: EMERGENCY MEDICINE

## 2021-01-10 PROCEDURE — 99285 EMERGENCY DEPT VISIT HI MDM: CPT

## 2021-01-10 PROCEDURE — 74176 CT ABD & PELVIS W/O CONTRAST: CPT

## 2021-01-10 PROCEDURE — 96374 THER/PROPH/DIAG INJ IV PUSH: CPT

## 2021-01-10 PROCEDURE — C9113 INJ PANTOPRAZOLE SODIUM, VIA: HCPCS | Performed by: EMERGENCY MEDICINE

## 2021-01-10 PROCEDURE — 96376 TX/PRO/DX INJ SAME DRUG ADON: CPT

## 2021-01-10 PROCEDURE — G1004 CDSM NDSC: HCPCS

## 2021-01-10 PROCEDURE — 71045 X-RAY EXAM CHEST 1 VIEW: CPT

## 2021-01-10 PROCEDURE — 0241U HB NFCT DS VIR RESP RNA 4 TRGT: CPT | Performed by: EMERGENCY MEDICINE

## 2021-01-10 PROCEDURE — 85025 COMPLETE CBC W/AUTO DIFF WBC: CPT | Performed by: EMERGENCY MEDICINE

## 2021-01-10 PROCEDURE — 80076 HEPATIC FUNCTION PANEL: CPT | Performed by: EMERGENCY MEDICINE

## 2021-01-10 PROCEDURE — 83690 ASSAY OF LIPASE: CPT | Performed by: EMERGENCY MEDICINE

## 2021-01-10 PROCEDURE — 96375 TX/PRO/DX INJ NEW DRUG ADDON: CPT

## 2021-01-10 PROCEDURE — 99285 EMERGENCY DEPT VISIT HI MDM: CPT | Performed by: EMERGENCY MEDICINE

## 2021-01-10 PROCEDURE — 83605 ASSAY OF LACTIC ACID: CPT | Performed by: EMERGENCY MEDICINE

## 2021-01-10 PROCEDURE — 93005 ELECTROCARDIOGRAM TRACING: CPT

## 2021-01-10 PROCEDURE — 96361 HYDRATE IV INFUSION ADD-ON: CPT

## 2021-01-10 PROCEDURE — 83735 ASSAY OF MAGNESIUM: CPT | Performed by: EMERGENCY MEDICINE

## 2021-01-10 RX ORDER — SODIUM CHLORIDE 9 MG/ML
125 INJECTION, SOLUTION INTRAVENOUS CONTINUOUS
Status: DISCONTINUED | OUTPATIENT
Start: 2021-01-10 | End: 2021-01-11

## 2021-01-10 RX ORDER — ACETAMINOPHEN 325 MG/1
650 TABLET ORAL EVERY 6 HOURS PRN
Status: DISCONTINUED | OUTPATIENT
Start: 2021-01-10 | End: 2021-01-14 | Stop reason: HOSPADM

## 2021-01-10 RX ORDER — PANTOPRAZOLE SODIUM 40 MG/1
40 INJECTION, POWDER, FOR SOLUTION INTRAVENOUS ONCE
Status: COMPLETED | OUTPATIENT
Start: 2021-01-10 | End: 2021-01-10

## 2021-01-10 RX ORDER — HYDROMORPHONE HCL/PF 1 MG/ML
0.6 SYRINGE (ML) INJECTION
Status: DISCONTINUED | OUTPATIENT
Start: 2021-01-10 | End: 2021-01-14 | Stop reason: HOSPADM

## 2021-01-10 RX ORDER — ONDANSETRON 2 MG/ML
4 INJECTION INTRAMUSCULAR; INTRAVENOUS ONCE
Status: COMPLETED | OUTPATIENT
Start: 2021-01-10 | End: 2021-01-10

## 2021-01-10 RX ORDER — HYDROMORPHONE HCL/PF 1 MG/ML
0.4 SYRINGE (ML) INJECTION
Status: DISCONTINUED | OUTPATIENT
Start: 2021-01-10 | End: 2021-01-14 | Stop reason: HOSPADM

## 2021-01-10 RX ORDER — SODIUM CHLORIDE, SODIUM LACTATE, POTASSIUM CHLORIDE, CALCIUM CHLORIDE 600; 310; 30; 20 MG/100ML; MG/100ML; MG/100ML; MG/100ML
130 INJECTION, SOLUTION INTRAVENOUS CONTINUOUS
Status: DISCONTINUED | OUTPATIENT
Start: 2021-01-10 | End: 2021-01-11

## 2021-01-10 RX ORDER — HYDROMORPHONE HCL/PF 1 MG/ML
0.2 SYRINGE (ML) INJECTION
Status: DISCONTINUED | OUTPATIENT
Start: 2021-01-10 | End: 2021-01-14 | Stop reason: HOSPADM

## 2021-01-10 RX ORDER — ONDANSETRON 2 MG/ML
4 INJECTION INTRAMUSCULAR; INTRAVENOUS EVERY 6 HOURS PRN
Status: DISCONTINUED | OUTPATIENT
Start: 2021-01-10 | End: 2021-01-14 | Stop reason: HOSPADM

## 2021-01-10 RX ORDER — HEPARIN SODIUM 5000 [USP'U]/ML
5000 INJECTION, SOLUTION INTRAVENOUS; SUBCUTANEOUS EVERY 8 HOURS SCHEDULED
Status: DISCONTINUED | OUTPATIENT
Start: 2021-01-10 | End: 2021-01-14 | Stop reason: HOSPADM

## 2021-01-10 RX ADMIN — SODIUM CHLORIDE 125 ML/HR: 0.9 INJECTION, SOLUTION INTRAVENOUS at 21:05

## 2021-01-10 RX ADMIN — ONDANSETRON 4 MG: 2 INJECTION INTRAMUSCULAR; INTRAVENOUS at 20:47

## 2021-01-10 RX ADMIN — PANTOPRAZOLE SODIUM 40 MG: 40 INJECTION, POWDER, FOR SOLUTION INTRAVENOUS at 18:53

## 2021-01-10 RX ADMIN — SODIUM CHLORIDE 1000 ML: 0.9 INJECTION, SOLUTION INTRAVENOUS at 22:30

## 2021-01-10 RX ADMIN — TOPICAL ANESTHETIC 2 SPRAY: 200 SPRAY DENTAL; PERIODONTAL at 21:55

## 2021-01-10 RX ADMIN — SODIUM CHLORIDE 1000 ML: 0.9 INJECTION, SOLUTION INTRAVENOUS at 18:52

## 2021-01-10 RX ADMIN — ONDANSETRON 4 MG: 2 INJECTION INTRAMUSCULAR; INTRAVENOUS at 18:53

## 2021-01-10 NOTE — ED PROVIDER NOTES
History  Chief Complaint   Patient presents with    Vomiting     Pt complains of vomiting since last night  Pt states that he feels it was something he ate  Pt had AR[place] last night  This 49-year-old male presents emergency department multiple episodes of vomiting since last night  Patient states he had [place] at 1:00 p m  [place] Several hours later a long long nauseous  He started vomiting last p m  VII or 8:00 p m   States he has been vomiting at least once an hour since last night  Unable to tolerate any p o  Fluids or solids  Persistent vomiting  [place] in color  Denies any black or bloody stool  Denies blood thinners  Generalized abdominal pain but more predominant in the left lower abdomen  No diarrhea  No prior abdominal surgeries  Symptoms are moderate to severe in nature  No aggravating or alleviating factors  Constant since last night  Feeling generally weak this afternoon and does not feel he can ambulate without getting dizzy  Differential diagnosis includes dehydration, electrolyte abnormality, COVID, anemia  Prior to Admission Medications   Prescriptions Last Dose Informant Patient Reported? Taking? amLODIPine (NORVASC) 5 mg tablet 1/9/2021 at Unknown time  Yes Yes   Sig: Take 5 mg by mouth daily   lisinopril (ZESTRIL) 10 mg tablet 1/9/2021 at Unknown time  Yes Yes   Sig: Take 10 mg by mouth daily   omeprazole (PriLOSEC) 20 mg delayed release capsule 1/9/2021 at Unknown time  Yes Yes   Sig: Take 20 mg by mouth daily       Facility-Administered Medications: None       Past Medical History:   Diagnosis Date    GERD (gastroesophageal reflux disease)     Hernia, internal 01/10/2021    Hypertension        Past Surgical History:   Procedure Laterality Date    ESOPHAGOGASTRODUODENOSCOPY N/A 10/18/2018    Procedure: ESOPHAGOGASTRODUODENOSCOPY (EGD); Surgeon: Kailey Guzmán MD;  [facility];   Service: Gastroenterology    ESOPHAGOGASTRODUODENOSCOPY N/A 1/18/2021    Procedure: ESOPHAGOGASTRODUODENOSCOPY (EGD); Surgeon: Amina Bernard MD;  [facility];  Service: Thoracic    NO PAST SURGERIES      PARAESOPHAGEAL HERNIA REPAIR N/A 1/18/2021    Procedure: REPAIR HERNIA PARAESOPHAGEAL  LAPAROSCOPIC;  Surgeon: Amina Bernard MD;  [facility];  Service: Thoracic    LA COLONOSCOPY FLX DX W/COLLJ SPEC WHEN PFRMD N/A 11/21/2017    Procedure: EGD AND COLONOSCOPY;  Surgeon: Maribeth Hayes MD;  [facility]; Service: Gastroenterology    LA EDG US EXAM SURGICAL ALTER STOM DUODENUM/JEJUNUM N/A 10/18/2018    Procedure: LINEAR ENDOSCOPIC U/S;  Surgeon: Dyan Guerrero MD;  [facility]; Service: Gastroenterology       History reviewed  No pertinent family history  I have reviewed and agree with the history as documented  E-Cigarette/Vaping    E-Cigarette Use Never User      E-Cigarette/Vaping Substances     Social History     Tobacco Use    Smoking status: Never Smoker    Smokeless tobacco: Never Used   Substance Use Topics    Alcohol use: Yes     Alcohol/week: 1 0 standard drinks     Types: 1 Glasses of wine per week     Frequency: 4 or more times a week     Drinks per session: 1 or 2     Binge frequency: Daily or almost daily     Comment: glass of wine/beer a day    Drug use: No       Review of Systems   Constitutional: Positive for fatigue  Negative for activity change, appetite change and fever  HENT: Negative for congestion, ear pain, rhinorrhea and sore throat  Eyes: Negative for pain and redness  Respiratory: Negative for cough, shortness of breath and wheezing  Cardiovascular: Negative for chest pain and palpitations  Gastrointestinal: Positive for abdominal pain, nausea and vomiting  Negative for diarrhea  Endocrine: Negative for polyuria  Genitourinary: Negative for difficulty urinating, dysuria, frequency and urgency  Musculoskeletal: Negative for arthralgias and myalgias  Skin: Negative for color change and rash  Allergic/Immunologic: Negative for immunocompromised state  Neurological: Positive for weakness  Negative for dizziness, syncope and light-headedness  Hematological: Does not bruise/bleed easily  Psychiatric/Behavioral: Negative for confusion  All other systems reviewed and are negative  Physical Exam  Physical Exam  Vitals signs and nursing note reviewed  Constitutional:       General: He is not in acute distress  Appearance: He is well-developed  HENT:      Head: Normocephalic and atraumatic  Nose: Nose normal    Eyes:      General: No scleral icterus  Conjunctiva/sclera: Conjunctivae normal    Neck:      Musculoskeletal: Normal range of motion and neck supple  Cardiovascular:      Rate and Rhythm: Tachycardia present  Heart sounds: Normal heart sounds  Comments: Frequent extrasystoles  Pulmonary:      Effort: Pulmonary effort is normal  No respiratory distress  Breath sounds: Normal breath sounds  No stridor  No wheezing  Abdominal:      General: There is no distension  Palpations: Abdomen is soft  Tenderness: There is abdominal tenderness (upper abdomen  decreased bowel sounds  )  There is no guarding or rebound  Musculoskeletal:         General: No deformity  Skin:     General: Skin is warm and dry  Findings: No rash  Neurological:      Mental Status: He is alert and oriented to person, place, and time  Psychiatric:         Thought Content:  Thought content normal          Vital Signs  ED Triage Vitals   Temperature Pulse Respirations Blood Pressure SpO2   01/10/21 1818 01/10/21 1815 01/10/21 1815 01/10/21 1815 01/10/21 1815   98 7 °F (37 1 °C) (!) 113 16 136/92 94 %      Temp Source Heart Rate Source Patient Position - Orthostatic VS BP Location FiO2 (%)   01/10/21 1815 01/10/21 1815 01/10/21 1815 01/10/21 1815 --   Oral Monitor Lying Right arm       Pain Score       01/10/21 1815       6           Vitals:    01/13/21 1457 01/13/21 2122 01/13/21 2220 01/14/21 0741   BP: (!) 180/90 156/92 161/84 142/80   Pulse: 74 80 72 66   Patient Position - Orthostatic VS: Lying  Lying          Visual Acuity      ED Medications  Medications   sodium chloride 0 9 % bolus 1,000 mL (0 mL Intravenous Stopped 1/10/21 2100)   ondansetron (ZOFRAN) injection 4 mg (4 mg Intravenous Given 1/10/21 1853)   pantoprazole (PROTONIX) injection 40 mg (40 mg Intravenous Given 1/10/21 1853)   ondansetron (ZOFRAN) injection 4 mg (4 mg Intravenous Given 1/10/21 2047)   benzocaine (HURRICAINE) 20 % mucosal spray 2 spray (2 sprays Mucosal Given 1/10/21 2155)   sodium chloride 0 9 % bolus 1,000 mL (0 mL Intravenous Stopped 1/11/21 0034)   lactated ringers bolus 1,000 mL (0 mL Intravenous Stopped 1/11/21 2245)   potassium chloride 20 mEq IVPB (premix) (0 mEq Intravenous Stopped 1/11/21 2329)   barium sulfate 98 % oral suspension 155 mL (155 mL Oral Given 1/13/21 1227)       Diagnostic Studies  Results Reviewed     Procedure Component Value Units Date/Time    Basic metabolic panel [[id number]]  (Abnormal) Collected: 01/12/21 0516    Lab Status: Final result Specimen: Blood from Arm, Left Updated: 01/12/21 0556     Sodium 145 mmol/L      Potassium 4 1 mmol/L      Chloride 112 mmol/L      CO2 30 mmol/L      ANION GAP 3 mmol/L      BUN 22 mg/dL      Creatinine 1 56 mg/dL      Glucose 108 mg/dL      Calcium 7 8 mg/dL      eGFR 42 ml/min/1 73sq m     Narrative:      [place] guidelines for Chronic Kidney Disease (CKD):     Stage 1 with normal or high GFR (GFR > 90 mL/min/1 73 square meters)    Stage 2 Mild CKD (GFR = 60-89 mL/min/1 73 square meters)    Stage 3A Moderate CKD (GFR = 45-59 mL/min/1 73 square meters)    Stage 3B Moderate CKD (GFR = 30-44 mL/min/1 73 square meters)    Stage 4 Severe CKD (GFR = 15-29 mL/min/1 73 square meters)    Stage 5 End Stage CKD (GFR <15 mL/min/1 73 square meters)  Note: GFR calculation is accurate only with a steady state creatinine    Magnesium [[id number]]  (Normal) Collected: 01/12/21 0516    Lab Status: Final result Specimen: Blood from Arm, Left Updated: 01/12/21 0556     Magnesium 1 9 mg/dL     CBC [[id number]]  (Abnormal) Collected: 01/12/21 0516    Lab Status: Final result Specimen: Blood from Arm, Left Updated: 01/12/21 0530     WBC 8 90 Thousand/uL      RBC 3 83 Million/uL      Hemoglobin 11 1 g/dL      Hematocrit 36 0 %      MCV 94 fL      MCH 29 0 pg      MCHC 30 8 g/dL      RDW 14 3 %      Platelets 218 Thousands/uL      MPV 11 1 fL     Comprehensive metabolic panel [[id number]]  (Abnormal) Collected: 01/11/21 0609    Lab Status: Final result Specimen: Blood from Arm, Right Updated: 01/11/21 0736     Sodium 149 mmol/L      Potassium 3 3 mmol/L      Chloride 109 mmol/L      CO2 36 mmol/L      ANION GAP 4 mmol/L      BUN 31 mg/dL      Creatinine 1 98 mg/dL      Glucose 116 mg/dL      Calcium 8 2 mg/dL      Corrected Calcium 9 1 mg/dL      AST 14 U/L      ALT 22 U/L      Alkaline Phosphatase 63 U/L      Total Protein 6 8 g/dL      Albumin 2 9 g/dL      Total Bilirubin 0 48 mg/dL      eGFR 32 ml/min/1 73sq m     Narrative:      [place] guidelines for Chronic Kidney Disease (CKD):     Stage 1 with normal or high GFR (GFR > 90 mL/min/1 73 square meters)    Stage 2 Mild CKD (GFR = 60-89 mL/min/1 73 square meters)    Stage 3A Moderate CKD (GFR = 45-59 mL/min/1 73 square meters)    Stage 3B Moderate CKD (GFR = 30-44 mL/min/1 73 square meters)    Stage 4 Severe CKD (GFR = 15-29 mL/min/1 73 square meters)    Stage 5 End Stage CKD (GFR <15 mL/min/1 73 square meters)  Note: GFR calculation is accurate only with a steady state creatinine    Phosphorus [[id number]]  (Normal) Collected: 01/11/21 0609    Lab Status: Final result Specimen: Blood from Arm, Right Updated: 01/11/21 0736     Phosphorus 4 0 mg/dL     Magnesium [[id number]]  (Normal) Collected: 01/11/21 5769    Lab Status: Final result Specimen: Blood from Arm, Right Updated: 01/11/21 0736     Magnesium 2 1 mg/dL     CBC [[id number]]  (Abnormal) Collected: 01/11/21 0609    Lab Status: Final result Specimen: Blood from Arm, Right Updated: 01/11/21 0647     WBC 15 58 Thousand/uL      RBC 4 34 Million/uL      Hemoglobin 12 7 g/dL      Hematocrit 40 1 %      MCV 92 fL      MCH 29 3 pg      MCHC 31 7 g/dL      RDW 14 5 %      Platelets 570 Thousands/uL      MPV 11 5 fL     Lactic acid 2 Hours [[id number]]  (Normal) Collected: 01/11/21 0040    Lab Status: Final result Specimen: Blood from Arm, Right Updated: 01/11/21 0337     LACTIC ACID 1 1 mmol/L     Narrative:      Result may be elevated if tourniquet was used during collection  NT-BNP PRO [[id number]]  (Abnormal) Collected: 01/10/21 1854    Lab Status: Final result Specimen: Blood from Arm, Right Updated: 01/10/21 2234     NT-proBNP 786 pg/mL     Lactic acid [[id number]]  (Abnormal) Collected: 01/10/21 2106    Lab Status: Final result Specimen: Blood from Arm, Right Updated: 01/10/21 2144     LACTIC ACID 2 1 mmol/L     Narrative:      Result may be elevated if tourniquet was used during collection  Troponin I [[id number]]  (Normal) Collected: 01/10/21 2106    Lab Status: Final result Specimen: Blood from Arm, Right Updated: 01/10/21 2138     Troponin I <0 02 ng/mL     COVID19, Influenza A/B, RSV PCR, [facility] [[id number]]  (Normal) Collected: 01/10/21 1855    Lab Status: Final result Specimen: Nares from Nose Updated: 01/10/21 2005     SARS-CoV-2 Negative     INFLUENZA A PCR Negative     INFLUENZA B PCR Negative     RSV PCR Negative    Narrative: This test has been authorized by FDA under an EUA (Emergency Use Assay) for use by authorized laboratories  Clinical caution and judgement should be used with the interpretation of these results with consideration of the clinical impression and other laboratory testing    Testing reported as "Positive" or "Negative" has been proven to be accurate according to standard laboratory validation requirements  All testing is performed with control materials showing appropriate reactivity at standard intervals      Hepatic function panel [[id number]]  (Abnormal) Collected: 01/10/21 1854    Lab Status: Final result Specimen: Blood from Arm, Right Updated: 01/10/21 1930     Total Bilirubin 0 53 mg/dL      Bilirubin, Direct 0 13 mg/dL      Alkaline Phosphatase 83 U/L      AST 16 U/L      ALT 22 U/L      Total Protein 8 7 g/dL      Albumin 3 7 g/dL     Lipase [[id number]]  (Normal) Collected: 01/10/21 1854    Lab Status: Final result Specimen: Blood from Arm, Right Updated: 01/10/21 1930     Lipase 217 u/L     Basic metabolic panel [[id number]]  (Abnormal) Collected: 01/10/21 1854    Lab Status: Final result Specimen: Blood from Arm, Right Updated: 01/10/21 1930     Sodium 146 mmol/L      Potassium 3 7 mmol/L      Chloride 104 mmol/L      CO2 35 mmol/L      ANION GAP 7 mmol/L      BUN 32 mg/dL      Creatinine 2 31 mg/dL      Glucose 167 mg/dL      Calcium 9 0 mg/dL      eGFR 26 ml/min/1 73sq m     Narrative:      [place] guidelines for Chronic Kidney Disease (CKD):     Stage 1 with normal or high GFR (GFR > 90 mL/min/1 73 square meters)    Stage 2 Mild CKD (GFR = 60-89 mL/min/1 73 square meters)    Stage 3A Moderate CKD (GFR = 45-59 mL/min/1 73 square meters)    Stage 3B Moderate CKD (GFR = 30-44 mL/min/1 73 square meters)    Stage 4 Severe CKD (GFR = 15-29 mL/min/1 73 square meters)    Stage 5 End Stage CKD (GFR <15 mL/min/1 73 square meters)  Note: GFR calculation is accurate only with a steady state creatinine    Magnesium [[id number]]  (Normal) Collected: 01/10/21 1854    Lab Status: Final result Specimen: Blood from Arm, Right Updated: 01/10/21 1930     Magnesium 2 4 mg/dL     CBC and differential [[id number]]  (Abnormal) Collected: 01/10/21 1854    Lab Status: Final result Specimen: Blood from Arm, Right Updated: 01/10/21 1918     WBC 16 00 Thousand/uL      RBC 5 13 Million/uL      Hemoglobin 15 0 g/dL      Hematocrit 46 7 %      MCV 91 fL      MCH 29 2 pg      MCHC 32 1 g/dL      RDW 14 5 %      MPV 12 0 fL      Platelets 169 Thousands/uL      nRBC 0 /100 WBCs      Neutrophils Relative 87 %      Immat GRANS % 1 %      Lymphocytes Relative 6 %      Monocytes Relative 6 %      Eosinophils Relative 0 %      Basophils Relative 0 %      Neutrophils Absolute 14 06 Thousands/µL      Immature Grans Absolute 0 09 Thousand/uL      Lymphocytes Absolute 0 91 Thousands/µL      Monocytes Absolute 0 92 Thousand/µL      Eosinophils Absolute 0 00 Thousand/µL      Basophils Absolute 0 02 Thousands/µL                  FL barium swallow   Final Result by Esther Scruggs MD (01/13 1641)      Large complex hiatal hernia  Essentially, this represents a large type III but predominantly paraesophageal hernia  Fundus remains below the diaphragm, but there is a transverse orientation of the remainder of the stomach above the diaphragm, which    extends to the right of midline  The gastric outlet and duodenum are displaced superiorly  There is no evidence of gastric outlet obstruction at this time  Workstation performed: [id number]         XR chest 1 view portable   Final Result by Ravi See MD (01/11 1111)      No acute cardiopulmonary disease  Right cardiophrenic opacity, in keeping with the moderate sized hiatal hernia containing distended stomach as seen on the prior CT study  Workstation performed: [id number]         CT abdomen pelvis wo contrast   Final Result by Benitez Barnett MD (01/10 2128)      Stomach is distended and filled with fluid  There is a hiatal hernia containing the distal stomach, pyloric sphincter and proximal duodenum; I suspect this results in gastric outlet obstruction               I personally discussed this study with Glenis Conde on 1/10/2021 at 9:26 PM                Workstation performed: [id number] Procedures  ECG 12 Lead Documentation Only    Date/Time: 1/10/2021 7:10 PM  Performed by: Honey Tellez MD  Authorized by: Honey Tellez MD     Indications / Diagnosis:  Tachycardia  ECG reviewed by me, the ED Provider: yes    Patient location:  ED  Rate:     ECG rate:  111    ECG rate assessment: tachycardic    Rhythm:     Rhythm: sinus rhythm    Ectopy:     Ectopy: PAC    QRS:     QRS axis:  Normal    QRS intervals:  Normal  Conduction:     Conduction: normal    ST segments:     ST segments:  Non-specific  T waves:     T waves: non-specific               ED Course  ED Course as of Jan 26 1106   Sun Aleks 10, 2021   2003 Patient states no prior hx of renal dysfunction to his knowledge        2051 Heme negative brown stool        2110 Patient with slight desat to 89% then back to 91%  Placed on 2 LPM via nasal cannula  2131 Surgical resident notified  2226 NGT with 1700 mL dark brown fluid  Added 2nd bolus of NSS over 2 hours  2253 Case discussed with surgical resident  Will admit patient to service of Dr Luther Parks  SBIRT 20yo+      Most Recent Value   SBIRT (24 yo +)   In order to provide better care to our patients, we are screening all of our patients for alcohol and drug use  Would it be okay to ask you these screening questions? Yes Filed at: 01/11/2021 0225   Initial Alcohol Screen: US AUDIT-C    1  How often do you have a drink containing alcohol?  0 Filed at: 01/11/2021 0225   2  How many drinks containing alcohol do you have on a typical day you are drinking? 0 Filed at: 01/11/2021 0225   3b  FEMALE Any Age, or MALE 65+: How often do you have 4 or more drinks on one occassion? 0 Filed at: 01/11/2021 0225   Audit-C Score  0 Filed at: 01/11/2021 0225   GRISELDA: How many times in the past year have you    Used an illegal drug or used a prescription medication for non-medical reasons?   Never Filed at: 01/11/2021 0225 MDM  Number of Diagnoses or Management Options  ISAAC (acute kidney injury) ([facility] ):   Dehydration:   Gastric outlet obstruction:   Hiatal hernia:      Amount and/or Complexity of Data Reviewed  Clinical lab tests: ordered and reviewed  Tests in the radiology section of CPT®: ordered and reviewed  Discuss the patient with other providers: yes        Disposition  Final diagnoses:   Gastric outlet obstruction   Hiatal hernia   ISAAC (acute kidney injury) ([facility] )   Dehydration     Time reflects when diagnosis was documented in both MDM as applicable and the Disposition within this note     Time User Action Codes Description Comment    1/10/2021 11:03 PM Delight Redo Add [K31 1] Gastric outlet obstruction     1/10/2021 11:03 PM Bartley Redo Add [K44 9] Hiatal hernia     1/10/2021 11:03 PM Agustin Prieto 107 [N17 9] ISAAC (acute kidney injury) ([facility] )     1/10/2021 11:03 PM Bartley Redo Add [E86 0] Dehydration       ED Disposition     ED Disposition Condition Date/Time Comment    Admit Stable Sun Aleks 10, 2021 11:07 PM Case was discussed with Mireille and the patient's admission status was agreed to be Admission Status: inpatient status to the service of Dr Roxanne Lorenzo           Follow-up Information     Follow up With Specialties Details Why Contact Info    Antonia Colón MD Thoracic Surgery, Thoracic Diseases, Cardiothoracic Surgery Follow up Call for follow up appointment within a week of hospital discharge [address]  [phone]            Discharge Medication List as of 1/14/2021  1:21 PM      CONTINUE these medications which have NOT CHANGED    Details   amLODIPine (NORVASC) 5 mg tablet Take 5 mg by mouth daily, Historical Med      lisinopril (ZESTRIL) 10 mg tablet Take 10 mg by mouth daily, Historical Med      omeprazole (PriLOSEC) 20 mg delayed release capsule Take 20 mg by mouth daily , Historical Med           Outpatient Discharge Orders   Ambulatory referral to Thoracic Surgery   Standing Status: Future Standing Exp   Date: 01/14/22      Discharge Diet     Discharge Diet     Activity as tolerated     Activity as tolerated     Lifting restrictions     Call provider for:  persistent nausea or vomiting     Call provider for:  severe uncontrolled pain     Call provider for:  redness, tenderness, or signs of infection (pain, swelling, redness, odor or green/yellow discharge around incision site)     Call provider for: active or persistent bleeding     Call provider for:  difficulty breathing, headache or visual disturbances       PDMP Review       Value Time User    PDMP Reviewed  Yes 1/13/2021  1:24 PM Anurag Rodriguez PA-C          ED Provider  Electronically Signed by           Leslie Jung MD  01/26/21 0163

## 2021-01-11 PROBLEM — K44.9 HIATAL HERNIA: Status: ACTIVE | Noted: 2021-01-11

## 2021-01-11 LAB
ALBUMIN SERPL BCP-MCNC: 2.9 G/DL (ref 3.5–5)
ALP SERPL-CCNC: 63 U/L (ref 46–116)
ALT SERPL W P-5'-P-CCNC: 22 U/L (ref 12–78)
ANION GAP SERPL CALCULATED.3IONS-SCNC: 4 MMOL/L (ref 4–13)
AST SERPL W P-5'-P-CCNC: 14 U/L (ref 5–45)
ATRIAL RATE: 111 BPM
BILIRUB SERPL-MCNC: 0.48 MG/DL (ref 0.2–1)
BUN SERPL-MCNC: 31 MG/DL (ref 5–25)
CALCIUM ALBUM COR SERPL-MCNC: 9.1 MG/DL (ref 8.3–10.1)
CALCIUM SERPL-MCNC: 8.2 MG/DL (ref 8.3–10.1)
CHLORIDE SERPL-SCNC: 109 MMOL/L (ref 100–108)
CO2 SERPL-SCNC: 36 MMOL/L (ref 21–32)
CREAT SERPL-MCNC: 1.98 MG/DL (ref 0.6–1.3)
ERYTHROCYTE [DISTWIDTH] IN BLOOD BY AUTOMATED COUNT: 14.5 % (ref 11.6–15.1)
GFR SERPL CREATININE-BSD FRML MDRD: 32 ML/MIN/1.73SQ M
GLUCOSE SERPL-MCNC: 116 MG/DL (ref 65–140)
HCT VFR BLD AUTO: 40.1 % (ref 36.5–49.3)
HGB BLD-MCNC: 12.7 G/DL (ref 12–17)
LACTATE SERPL-SCNC: 1.1 MMOL/L (ref 0.5–2)
MAGNESIUM SERPL-MCNC: 2.1 MG/DL (ref 1.6–2.6)
MCH RBC QN AUTO: 29.3 PG (ref 26.8–34.3)
MCHC RBC AUTO-ENTMCNC: 31.7 G/DL (ref 31.4–37.4)
MCV RBC AUTO: 92 FL (ref 82–98)
P AXIS: 69 DEGREES
PHOSPHATE SERPL-MCNC: 4 MG/DL (ref 2.3–4.1)
PLATELET # BLD AUTO: 243 THOUSANDS/UL (ref 149–390)
PMV BLD AUTO: 11.5 FL (ref 8.9–12.7)
POTASSIUM SERPL-SCNC: 3.3 MMOL/L (ref 3.5–5.3)
PR INTERVAL: 146 MS
PROT SERPL-MCNC: 6.8 G/DL (ref 6.4–8.2)
QRS AXIS: 22 DEGREES
QRSD INTERVAL: 88 MS
QT INTERVAL: 312 MS
QTC INTERVAL: 424 MS
RBC # BLD AUTO: 4.34 MILLION/UL (ref 3.88–5.62)
SODIUM SERPL-SCNC: 149 MMOL/L (ref 136–145)
T WAVE AXIS: 51 DEGREES
VENTRICULAR RATE: 111 BPM
WBC # BLD AUTO: 15.58 THOUSAND/UL (ref 4.31–10.16)

## 2021-01-11 PROCEDURE — 83605 ASSAY OF LACTIC ACID: CPT | Performed by: SURGERY

## 2021-01-11 PROCEDURE — 80053 COMPREHEN METABOLIC PANEL: CPT | Performed by: SURGERY

## 2021-01-11 PROCEDURE — 84100 ASSAY OF PHOSPHORUS: CPT | Performed by: SURGERY

## 2021-01-11 PROCEDURE — C9113 INJ PANTOPRAZOLE SODIUM, VIA: HCPCS | Performed by: SURGERY

## 2021-01-11 PROCEDURE — 85027 COMPLETE CBC AUTOMATED: CPT | Performed by: SURGERY

## 2021-01-11 PROCEDURE — 93010 ELECTROCARDIOGRAM REPORT: CPT | Performed by: INTERNAL MEDICINE

## 2021-01-11 PROCEDURE — 99222 1ST HOSP IP/OBS MODERATE 55: CPT | Performed by: SURGERY

## 2021-01-11 PROCEDURE — 36415 COLL VENOUS BLD VENIPUNCTURE: CPT | Performed by: SURGERY

## 2021-01-11 PROCEDURE — 0DJ08ZZ INSPECTION OF UPPER INTESTINAL TRACT, VIA NATURAL OR ARTIFICIAL OPENING ENDOSCOPIC: ICD-10-PCS | Performed by: INTERNAL MEDICINE

## 2021-01-11 PROCEDURE — 83735 ASSAY OF MAGNESIUM: CPT | Performed by: SURGERY

## 2021-01-11 PROCEDURE — 99223 1ST HOSP IP/OBS HIGH 75: CPT | Performed by: INTERNAL MEDICINE

## 2021-01-11 RX ORDER — HYDRALAZINE HYDROCHLORIDE 20 MG/ML
5 INJECTION INTRAMUSCULAR; INTRAVENOUS EVERY 6 HOURS PRN
Status: DISCONTINUED | OUTPATIENT
Start: 2021-01-11 | End: 2021-01-14 | Stop reason: HOSPADM

## 2021-01-11 RX ORDER — POTASSIUM CHLORIDE 14.9 MG/ML
20 INJECTION INTRAVENOUS 3 TIMES DAILY
Status: COMPLETED | OUTPATIENT
Start: 2021-01-11 | End: 2021-01-11

## 2021-01-11 RX ORDER — PANTOPRAZOLE SODIUM 40 MG/1
40 INJECTION, POWDER, FOR SOLUTION INTRAVENOUS
Status: DISCONTINUED | OUTPATIENT
Start: 2021-01-11 | End: 2021-01-14 | Stop reason: HOSPADM

## 2021-01-11 RX ORDER — DEXTROSE, SODIUM CHLORIDE, AND POTASSIUM CHLORIDE 5; .45; .15 G/100ML; G/100ML; G/100ML
100 INJECTION INTRAVENOUS CONTINUOUS
Status: DISCONTINUED | OUTPATIENT
Start: 2021-01-11 | End: 2021-01-14

## 2021-01-11 RX ADMIN — POTASSIUM CHLORIDE 20 MEQ: 200 INJECTION, SOLUTION INTRAVENOUS at 08:33

## 2021-01-11 RX ADMIN — SODIUM CHLORIDE, SODIUM LACTATE, POTASSIUM CHLORIDE, AND CALCIUM CHLORIDE 1000 ML: .6; .31; .03; .02 INJECTION, SOLUTION INTRAVENOUS at 08:31

## 2021-01-11 RX ADMIN — HEPARIN SODIUM 5000 UNITS: 5000 INJECTION INTRAVENOUS; SUBCUTANEOUS at 06:09

## 2021-01-11 RX ADMIN — PANTOPRAZOLE SODIUM 40 MG: 40 INJECTION, POWDER, FOR SOLUTION INTRAVENOUS at 08:34

## 2021-01-11 RX ADMIN — SODIUM CHLORIDE, SODIUM LACTATE, POTASSIUM CHLORIDE, AND CALCIUM CHLORIDE 150 ML/HR: .6; .31; .03; .02 INJECTION, SOLUTION INTRAVENOUS at 00:34

## 2021-01-11 RX ADMIN — POTASSIUM CHLORIDE 20 MEQ: 200 INJECTION, SOLUTION INTRAVENOUS at 21:29

## 2021-01-11 RX ADMIN — POTASSIUM CHLORIDE 20 MEQ: 200 INJECTION, SOLUTION INTRAVENOUS at 15:06

## 2021-01-11 RX ADMIN — SODIUM CHLORIDE, SODIUM LACTATE, POTASSIUM CHLORIDE, AND CALCIUM CHLORIDE 150 ML/HR: .6; .31; .03; .02 INJECTION, SOLUTION INTRAVENOUS at 06:05

## 2021-01-11 RX ADMIN — DEXTROSE, SODIUM CHLORIDE, AND POTASSIUM CHLORIDE 125 ML/HR: 5; .45; .15 INJECTION INTRAVENOUS at 17:23

## 2021-01-11 RX ADMIN — HEPARIN SODIUM 5000 UNITS: 5000 INJECTION INTRAVENOUS; SUBCUTANEOUS at 13:28

## 2021-01-11 RX ADMIN — HEPARIN SODIUM 5000 UNITS: 5000 INJECTION INTRAVENOUS; SUBCUTANEOUS at 21:29

## 2021-01-11 RX ADMIN — HEPARIN SODIUM 5000 UNITS: 5000 INJECTION INTRAVENOUS; SUBCUTANEOUS at 00:35

## 2021-01-11 NOTE — NURSING NOTE
Dr Elkin Brink entered room and confirmed that NGT should be to medium continuous suction  Suction was increased to medium continuous

## 2021-01-11 NOTE — ED NOTES
NG tube flushed with 60 cc of NS  And sump tubing flushed with air per order   Output of 200mL total of black coffee ground thompsonesis     Bc Ledesma, GARRETT  01/11/21 9986

## 2021-01-11 NOTE — CONSULTS
Consultation - GI   Suman Castañeda 66 y o  male MRN: [id number]  Unit/Bed#: ED 13 Encounter: [id number]      Assessment/Plan     1  GOO - Mechanical in nature, hiatal hernia  Surgical team on board, at present conservative management with NGT/NPO  Plan is to transfer to [facility] if unable to show improvement with this  Recent scopes, unremarkable results helping to rule out malignancy as a cause of this  Patient would have no benefit from endoscopic balloon dilatation, as this seems purely mechanical  Treatment overall for him would be surgical procedure in future  Continue symptomatic care  Total NGT output 2 7L so far  2  Colonic Polyps - repeats scope every 3 years, due in 2020  Outpatient follow up for colonoscopy  3  ISAAC - no prior labs to compare  improvement on fluids  Presented with 2 3, Repeat creat showing 2  Pre-renal in nature  As per primary  4  Hypernatremia, Hypokalemia, and Non-AG hyperchloremic acidosis - Overall, could be result of Acute renal failure vs  GOO  Urinary AG could help further differentiate, but would not  at this time  5  Leukocytosis - could be stress response  Beginning to trend down marginally with fluids  Monitor  History of Present Illness   Physician Requesting Consult: Bill Cea, DO  Reason for Consult / Principal Problem: GOO  Hx and PE limited by:   HPI: Suman Castañeda is a 66y o  year old male who presents with acute and severe abdominal pain associated with nausea, vomiting, and weakness for 1 day  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, duodenal nodule, colonic polyps requiring scope every 3 years, and HTN, not on AC  Yesterday afternoon he had [employer], several hours later developed nausea  Started vomiting around 8pm, unable to tolerate anything oral  Brown vomitus  He also had constant, generalized abdominal pain which is most prominent in LLQ  No aggravating or alleviating factors       No diarrhea, abd distention, melena, hematochezia, hematemesis, unexpected weight loss, tenesmus  Drinks alcohol 3-4 times per week (swithces between hard liquor and wine), never smoker  Last BM yesterday, normal BM frequency is daily  Last flex sig in 11/2017 during which polyps were removed, due for repeat colonoscopy in 2020  Unable to find report for this, patient unable to provide more information  Last EGD in 2018, at which time EUS also done Showing Lang's, large hiatal hernia, and 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 measuring [id number] in duodenum  Surgical Onc  Evaluation was recommended along with annual EGD/EUS  [facility]  COVID negative  In ED, tachycardic at 113 and placed on 2L o2 for desaturating to 89%  Significant labs include: lactate of 2 1, creat of 2 3, WBC of 16 with neutrophil predominance  LFTs WNL  Heme negative stool  CT AP wo con showing hiatal hernia containing "distal stomach, pyloric sphincter and proximal duodenum" with suspicion of GOO  Also shows cholelithiasis without cholecystitis  Surgical team on board, at present conservative management with NGT/NPO  Plan is to transfer to [facility] if unable to show improvement with this  Since admission, vitals WNL and now on room air, saturating mid 90s  Lactate has normalized, on LR  Started on Zofran, pantoprazole, prn dilaudid for pain  No complaints at present  Inpatient consult to gastroenterology  Consult performed by: Eboni Garcia MD  Consult ordered by: Varinder Fulton DO          Inpatient consult to gastroenterology     Performed by  Eboni Garcia MD     Authorized by Varinder Fulton DO              Review of Systems   Gastrointestinal: Positive for abdominal pain, nausea and vomiting  All other systems reviewed and are negative        Historical Information   Past Medical History:   Diagnosis Date    GERD (gastroesophageal reflux disease)     Hypertension      Past Surgical History:   Procedure Laterality Date    ESOPHAGOGASTRODUODENOSCOPY N/A 10/18/2018    Procedure: ESOPHAGOGASTRODUODENOSCOPY (EGD); Surgeon: Micky Pabon MD;  [facility]; Service: Gastroenterology    NO PAST SURGERIES      SC COLONOSCOPY FLX DX W/COLLJ SPEC WHEN PFRMD N/A 11/21/2017    Procedure: EGD AND COLONOSCOPY;  Surgeon: Kinjal Lazar MD;  [facility]; Service: Gastroenterology    SC EDG US EXAM SURGICAL ALTER STOM DUODENUM/JEJUNUM N/A 10/18/2018    Procedure: LINEAR ENDOSCOPIC U/S;  Surgeon: Micky Pabon MD;  [facility]; Service: Gastroenterology     Social History   Social History     Substance and Sexual Activity   Alcohol Use Yes    Comment: 3-4 per week     Social History     Substance and Sexual Activity   Drug Use No     E-Cigarette/Vaping    E-Cigarette Use Never User      E-Cigarette/Vaping Substances     Social History     Tobacco Use   Smoking Status Never Smoker   Smokeless Tobacco Never Used     Family History: History reviewed  No pertinent family history  Meds/Allergies   all current active meds have been reviewed    No Known Allergies    Objective       Intake/Output Summary (Last 24 hours) at 1/11/2021 0855  Last data filed at 1/11/2021 0848  Gross per 24 hour   Intake 2185 ml   Output 2850 ml   Net -665 ml       Invasive Devices:   Peripheral IV 01/10/21 Right Antecubital (Active)   Site Assessment Intact;Dry;Clean 01/10/21 1833   Line Status Blood return noted; Saline locked; Flushed 01/10/21 1833   Dressing Status Clean; Intact;Dry 01/10/21 1833       NG/OG/Enteral Tube Nasogastric 16 Fr Left nares (Active)   Placement Reverification Other (Comment) 01/10/21 2221   Site Assessment Clean;Dry; Intact 01/10/21 2221   External Tube Length (cm) 23 cm 01/10/21 2221   Status Suction-low continuous 01/10/21 2221   Drainage Appearance Thick;Brown 01/11/21 0621   Intake (mL) 60 mL 01/11/21 0621   Output (mL) 950 mL 01/11/21 9983       Physical Exam  Vitals signs and nursing note reviewed  Constitutional:       Appearance: He is well-developed  HENT:      Head: Normocephalic and atraumatic  Nose: Nose normal       Comments: NGT in place, no contents in cannister at present  Eyes:      Conjunctiva/sclera: Conjunctivae normal    Neck:      Musculoskeletal: Neck supple  Cardiovascular:      Rate and Rhythm: Normal rate and regular rhythm  Heart sounds: Normal heart sounds  No murmur  Pulmonary:      Effort: Pulmonary effort is normal  No respiratory distress  Breath sounds: Examination of the right-lower field reveals decreased breath sounds  Examination of the left-lower field reveals decreased breath sounds  Decreased breath sounds present  Abdominal:      Palpations: Abdomen is soft  Tenderness: There is no abdominal tenderness  Musculoskeletal:         General: No swelling or tenderness  Right lower leg: No edema  Left lower leg: No edema  Skin:     General: Skin is warm and dry  Coloration: Skin is not jaundiced or pale  Neurological:      General: No focal deficit present  Mental Status: He is alert and oriented to person, place, and time  Mental status is at baseline  Psychiatric:         Mood and Affect: Mood normal          Behavior: Behavior normal          Thought Content: Thought content normal          Judgment: Judgment normal          Lab Results: I have personally reviewed pertinent reports  Imaging Studies: I have personally reviewed pertinent reports  EKG, Pathology, and Other Studies: I have personally reviewed pertinent reports  VTE Prophylaxis: Heparin    Counseling / Coordination of Care  Total floor / unit time spent today 40 minutes  Greater than 50% of total time was spent with the patient and / or family counseling and / or coordination of care   A description of the counseling / coordination of care: above

## 2021-01-11 NOTE — H&P
H&P- General Surgery  Juan C Werner 66 y o  male MRN: [id number]  Unit/Bed#: ED 13 Encounter: [id number]        Assessment/Plan     Assessment:  66 M w/ PMH of hiatal hernia and Lang's (last EGD 2018) w/ imaging concerning for type IV hiatal hernia w/ gastric outlet obstruction    NGT with 1700 cc on insertion    Plan:  NPO/NGT  LR @ 150  I/Os  F/u labs  DVT ppx  Pain/Nausea Control  OOB as tolerated  Transfer to [facility] if patient does not improve with conservative measures    History of Present Illness     HPI:  Juan C Werner is a 66 y o  male who present presented with severe abdominal pain, nausea and vomiting  He was initially concerned for food poisoning  States the pain came on suddenly and occurred simultaneously with the nausea and vomiting  Patient was unable to keep anything down, and pain was worsening so he came to the emergency department for further evaluation this evening  In the ED Workup shows white blood cell count of 16; lactic acid of 2 1 creatinine of 2 3; his CT shows still a bit distended filled with fluid  Hiatal hernia containing the distal stomach, pyloric sphincter, and proximal duodenum  Chart review reveals EGDs in 2017 and 2018  In 2018 the EGD notes a large hiatal hernia of 7 cm  It also notes Lang's esophagus  Review of Systems   Constitutional: Negative for chills and fever  HENT: Negative for congestion and sore throat  Eyes: Negative for pain and visual disturbance  Respiratory: Negative for cough and shortness of breath  Cardiovascular: Negative for chest pain and palpitations  Gastrointestinal: Negative for abdominal pain and vomiting  Genitourinary: Negative for dysuria and hematuria  Musculoskeletal: Negative for arthralgias and back pain  Skin: Negative for color change and rash  Neurological: Negative for seizures and syncope  Psychiatric/Behavioral: Negative for agitation and behavioral problems     All other systems reviewed and are negative  Historical Information   Past Medical History:   Diagnosis Date    GERD (gastroesophageal reflux disease)     Hypertension      Past Surgical History:   Procedure Laterality Date    ESOPHAGOGASTRODUODENOSCOPY N/A 10/18/2018    Procedure: ESOPHAGOGASTRODUODENOSCOPY (EGD); Surgeon: Suki Orona MD;  [facility]; Service: Gastroenterology    NO PAST SURGERIES      CT COLONOSCOPY FLX DX W/COLLJ SPEC WHEN PFRMD N/A 11/21/2017    Procedure: EGD AND COLONOSCOPY;  Surgeon: Huyen Anand MD;  [facility]; Service: Gastroenterology    CT EDG US EXAM SURGICAL ALTER STOM DUODENUM/JEJUNUM N/A 10/18/2018    Procedure: LINEAR ENDOSCOPIC U/S;  Surgeon: Suki Orona MD;  [facility]; Service: Gastroenterology     Social History   Social History     Substance and Sexual Activity   Alcohol Use Yes    Comment: 3-4 per week     Social History     Substance and Sexual Activity   Drug Use No     Social History     Tobacco Use   Smoking Status Never Smoker   Smokeless Tobacco Never Used     Family History: History reviewed  No pertinent family history  Meds/Allergies   PTA meds:   Prior to Admission Medications   Prescriptions Last Dose Informant Patient Reported? Taking?    amLODIPine (NORVASC) 5 mg tablet 1/9/2021 at Unknown time  Yes Yes   Sig: Take 5 mg by mouth daily   lisinopril (ZESTRIL) 10 mg tablet 1/9/2021 at Unknown time  Yes Yes   Sig: Take 10 mg by mouth daily   omeprazole (PriLOSEC) 20 mg delayed release capsule 1/9/2021 at Unknown time  Yes Yes   Sig: Take 20 mg by mouth daily       Facility-Administered Medications: None     No Known Allergies    Objective   First Vitals:   Blood Pressure: 136/92 (01/10/21 1815)  Pulse: (!) 113 (01/10/21 1815)  Temperature: 98 7 °F (37 1 °C) (01/10/21 1818)  Temp Source: Oral (01/10/21 1815)  Respirations: 16 (01/10/21 1815)  Height: 6' (182 9 cm) (01/10/21 1815)  Weight - Scale: 81 6 kg (180 lb) (01/10/21 1815)  SpO2: 94 % (01/10/21 1815)    Current Vitals:   Blood Pressure: 158/94 (01/10/21 2300)  Pulse: (!) 116 (01/10/21 2300)  Temperature: 98 7 °F (37 1 °C) (01/10/21 1818)  Temp Source: Oral (01/10/21 1818)  Respirations: 18 (01/10/21 2300)  Height: 6' (182 9 cm) (01/10/21 1815)  Weight - Scale: 81 6 kg (180 lb) (01/10/21 1815)  SpO2: 96 % (01/10/21 2300)      Intake/Output Summary (Last 24 hours) at 1/11/2021 0020  Last data filed at 1/10/2021 2221  Gross per 24 hour   Intake 1000 ml   Output 1750 ml   Net -750 ml       Invasive Devices     Peripheral Intravenous Line            Peripheral IV 01/10/21 Right Antecubital less than 1 day          Drain            NG/OG/Enteral Tube Nasogastric 16 Fr Left nares less than 1 day                Physical Exam  Vitals signs reviewed  Constitutional:       General: He is not in acute distress  HENT:      Head: Normocephalic and atraumatic  Nose: No congestion  Mouth/Throat:      Mouth: Mucous membranes are moist       Pharynx: Oropharynx is clear  Eyes:      General: No scleral icterus  Extraocular Movements: Extraocular movements intact  Conjunctiva/sclera: Conjunctivae normal       Pupils: Pupils are equal, round, and reactive to light  Neck:      Musculoskeletal: Normal range of motion and neck supple  Cardiovascular:      Rate and Rhythm: Normal rate  Pulses: Normal pulses  Pulmonary:      Effort: No respiratory distress  Abdominal:      Comments: Distended, mildly tender to palpation in epigastric region and left upper quadrant  No guarding or rebound  Genitourinary:     Comments: Deferred  Musculoskeletal: Normal range of motion  General: No swelling  Skin:     General: Skin is warm and dry  Capillary Refill: Capillary refill takes less than 2 seconds  Neurological:      General: No focal deficit present  Mental Status: He is alert and oriented to person, place, and time     Psychiatric:         Mood and Affect: Mood normal  Behavior: Behavior normal            Lab Results: I have personally reviewed pertinent lab results  Imaging: I have personally reviewed pertinent reports  EKG, Pathology, and Other Studies: I have personally reviewed pertinent reports        Code Status: No Order  Advance Directive and Living Will:      Power of :    POLST:

## 2021-01-11 NOTE — UTILIZATION REVIEW
Initial Clinical Review    Admission: Date/Time/Statement:   Admission Orders (From admission, onward)     Ordered        01/10/21 2314  Inpatient Admission  Once                   Orders Placed This Encounter   Procedures    Inpatient Admission     Standing Status:   Standing     Number of Occurrences:   1     Order Specific Question:   Admitting Physician     Answer:   Tiffany Jorge [[id number]]     Order Specific Question:   Level of Care     Answer:   Med Surg [[id number]]     Order Specific Question:   Estimated length of stay     Answer:   More than 2 Midnights     Order Specific Question:   Certification     Answer:   I certify that inpatient services are medically necessary for this patient for a duration of greater than two midnights  See H&P and MD Progress Notes for additional information about the patient's course of treatment  ED Arrival Information     Expected Arrival Acuity Means of Arrival Escorted By Service Admission Type    - 1/10/2021 17:24 Urgent Walk-In Spouse General Medicine Urgent    Arrival Complaint    Ina Melendez        Chief Complaint   Patient presents with    Vomiting     Pt complains of vomiting since last night  Pt states that he feels it was something he ate  Pt had [employer] last night  Assessment/Plan: this is a 66year old male from home to ED admitted inpatient due to  Type IV hiatal hernia with gastric outlet obstruction  Presented due to multiple episodes of vomiting since eating at [employer] at about 2200 on 1/9/2021  Unable to tolerate po  Has abdominal pain primarily in left lower abdomen  Now dizzy with ambulation  Weak  On exam tachycardic  Frequent extrasystoles  Abdominal tenderness upper abdomen  Bowel sounds decreased  desat to 89%  Lactic acid 2 1  NT-proBNP 786  Na 146  Bun 32  Creatinine 2 31 with unknown baseline  Wbc 16  Ct abdomen showed gastric outlet obstruction    In the ED placed on oxygen 2 liters, NGT placed and yielded 1700 ml dark brown fluid    Given IVF 1 liter bolus twice, Zofran 4 mg IV x 2, Protonix 40 mg IV  Plan is continued NGT to suction, NPO, IVF, pain and nausea control  Consult GI    1/11/2021 NGT output 2 7 L thus far  NGT to medium continuous suction  Plan is continued conservative management with NGT, NPO, and if no improvement will need thoracic surgery evaluation   1/11/2021 per GI - patient with gastric outlet obstruction due to hiatal hernia  Recommend symptomatic care, NGT  ED Triage Vitals   Temperature Pulse Respirations Blood Pressure SpO2   01/10/21 1818 01/10/21 1815 01/10/21 1815 01/10/21 1815 01/10/21 1815   98 7 °F (37 1 °C) (!) 113 16 136/92 94 %      Temp Source Heart Rate Source Patient Position - Orthostatic VS BP Location FiO2 (%)   01/10/21 1815 01/10/21 1815 01/10/21 1815 01/10/21 1815 --   Oral Monitor Lying Right arm       Pain Score       01/10/21 1815       6          Wt Readings from Last 1 Encounters:   01/10/21 81 6 kg (180 lb)     Additional Vital Signs:   01/11/21 10:36:21  98 °F (36 7 °C)  81  16  146/81  103  90 %           01/11/21 0900    94    144/74  101  91 %      None (Room air)  Lying   01/11/21 0800  99 1 °F (37 3 °C)  92    128/71  94  94 %      None (Room air)  Lying   01/11/21 0615    92        93 %      None (Room air)     01/11/21 0300    86    144/85  107  98 %           01/11/21 0000    102  20  151/85  110  95 %  28  2 L/min  Nasal cannula  Lying   01/10/21 2300    116Abnormal   18  158/94  120  96 %      None (Room air)  Lying   01/10/21 2100    112Abnormal   18  158/85  113  96 %      None (Room air)         Pertinent Labs/Diagnostic Test Results:   1/10/2021 ct abdomen  Stomach is distended and filled with fluid  There is a hiatal hernia containing the distal stomach, pyloric sphincter and proximal duodenum; I suspect this results in gastric outlet obstruction    1/10/2021 EKG - sinus tachycardia    Non specific ST and T   Results from last 7 days Lab Units 01/10/21  1855   SARS-COV-2  Negative     Results from last 7 days   Lab Units 01/11/21  0609 01/10/21  1854   WBC Thousand/uL 15 58* 16 00*   HEMOGLOBIN g/dL 12 7 15 0   HEMATOCRIT % 40 1 46 7   PLATELETS Thousands/uL 243 288   NEUTROS ABS Thousands/µL  --  14 06*     Results from last 7 days   Lab Units 01/11/21  0609 01/10/21  1854   SODIUM mmol/L 149* 146*   POTASSIUM mmol/L 3 3* 3 7   CHLORIDE mmol/L 109* 104   CO2 mmol/L 36* 35*   ANION GAP mmol/L 4 7   BUN mg/dL 31* 32*   CREATININE mg/dL 1 98* 2 31*   EGFR ml/min/1 73sq m 32 26   CALCIUM mg/dL 8 2* 9 0   MAGNESIUM mg/dL 2 1 2 4   PHOSPHORUS mg/dL 4 0  --      Results from last 7 days   Lab Units 01/11/21  0609 01/10/21  1854   AST U/L 14 16   ALT U/L 22 22   ALK PHOS U/L 63 83   TOTAL PROTEIN g/dL 6 8 8 7*   ALBUMIN g/dL 2 9* 3 7   TOTAL BILIRUBIN mg/dL 0 48 0 53   BILIRUBIN DIRECT mg/dL  --  0 13     Results from last 7 days   Lab Units 01/11/21  0609 01/10/21  1854   GLUCOSE RANDOM mg/dL 116 167*     Results from last 7 days   Lab Units 01/10/21  2106   TROPONIN I ng/mL <0 02     Results from last 7 days   Lab Units 01/11/21  0040 01/10/21  2106   LACTIC ACID mmol/L 1 1 2 1*     Results from last 7 days   Lab Units 01/10/21  1854   NT-PRO BNP pg/mL 786*     Results from last 7 days   Lab Units 01/10/21  1854   LIPASE u/L 217     Results from last 7 days   Lab Units 01/10/21  1855   INFLUENZA A PCR  Negative   INFLUENZA B PCR  Negative   RSV PCR  Negative     ED Treatment:   Medication Administration from 01/10/2021 1724 to 01/11/2021 1032       Date/Time Order Dose Route Action Comments     01/10/2021 1852 sodium chloride 0 9 % bolus 1,000 mL 1,000 mL Intravenous New Bag      01/10/2021 1853 ondansetron (ZOFRAN) injection 4 mg 4 mg Intravenous Given      01/10/2021 1853 pantoprazole (PROTONIX) injection 40 mg 40 mg Intravenous Given      01/10/2021 2047 ondansetron (ZOFRAN) injection 4 mg 4 mg Intravenous Given      01/10/2021 2105 sodium chloride 0 9 % infusion 125 mL/hr Intravenous New Bag      01/10/2021 2155 benzocaine (HURRICAINE) 20 % mucosal spray 2 spray 2 spray Mucosal Given      01/10/2021 2230 sodium chloride 0 9 % bolus 1,000 mL 1,000 mL Intravenous New Bag      01/11/2021 8719 lactated ringers infusion 150 mL/hr Intravenous New Bag      01/11/2021 0034 lactated ringers infusion 150 mL/hr Intravenous New Bag      01/11/2021 0609 heparin (porcine) subcutaneous injection 5,000 Units 5,000 Units Subcutaneous Given      01/11/2021 0035 heparin (porcine) subcutaneous injection 5,000 Units 5,000 Units Subcutaneous Given      01/11/2021 0834 pantoprazole (PROTONIX) injection 40 mg 40 mg Intravenous Given      01/11/2021 0831 lactated ringers bolus 1,000 mL 1,000 mL Intravenous New Bag      01/11/2021 0833 potassium chloride 20 mEq IVPB (premix) 20 mEq Intravenous New Bag         Past Medical History:   Diagnosis Date    GERD (gastroesophageal reflux disease)     Hypertension      Present on Admission:  **None**      Admitting Diagnosis: Dehydration [E86 0]  Hiatal hernia [K44 9]  Gastric outlet obstruction [K31 1]  ISAAC (acute kidney injury) ([facility] [address] ) [N17 9]  Age/Sex: 66 y o  male  Admission Orders:  1/10/2021 2314 inpatient   Scheduled Medications:  heparin (porcine), 5,000 Units, Subcutaneous, Q8H [address]  pantoprazole, 40 mg, Intravenous, Q24H [address]  potassium chloride, 20 mEq, Intravenous, TID      Continuous IV Infusions:  lactated ringers, 150 mL/hr, Intravenous, Continuous      PRN Meds: not used   acetaminophen, 650 mg, Oral, Q6H PRN  hydrALAZINE, 5 mg, Intravenous, Q6H PRN  HYDROmorphone, 0 2 mg, Intravenous, Q3H PRN  HYDROmorphone, 0 4 mg, Intravenous, Q3H PRN  HYDROmorphone, 0 6 mg, Intravenous, Q3H PRN  ondansetron, 4 mg, Intravenous, Q6H PRN    NGT to suction       IP CONSULT TO GASTROENTEROLOGY    Network Utilization Review Department  ATTENTION: Please call with any questions or concerns to [phone] and carefully listen to the prompts so that you are directed to the right person  All voicemails are confidential   Radha Queen all requests for admission clinical reviews, approved or denied determinations and any other requests to dedicated fax number below belonging to the campus where the patient is receiving treatment   List of dedicated fax numbers for the Facilities:  [address] DENIALS (Administrative/Medical Necessity) [phone]   [address] (Maternity/NICU/Pediatrics) [phone] [address] Lexie Castellano 2656 (  Olayinka Oneill "Arianna" 103) [address] [address] [address] [address] [place] [address] [phone]

## 2021-01-11 NOTE — PLAN OF CARE
Problem: Potential for Falls  Goal: Patient will remain free of falls  Description: INTERVENTIONS:  - Assess patient frequently for physical needs  -  Identify cognitive and physical deficits and behaviors that affect risk of falls  -  [place] fall precautions as indicated by assessment   - Educate patient/family on patient safety including physical limitations  - Instruct patient to call for assistance with activity based on assessment  - Modify environment to reduce risk of injury  - Consider OT/PT consult to assist with strengthening/mobility  Outcome: Progressing     Problem: Prexisting or High Potential for Compromised Skin Integrity  Goal: Skin integrity is maintained or improved  Description: INTERVENTIONS:  - Identify patients at risk for skin breakdown  - Assess and monitor skin integrity  - Assess and monitor nutrition and hydration status  - Monitor labs   - Assess for incontinence   - Turn and reposition patient  - Assist with mobility/ambulation  - Relieve pressure over bony prominences  - Avoid friction and shearing  - Provide appropriate hygiene as needed including keeping skin clean and dry  - Evaluate need for skin moisturizer/barrier cream  - Collaborate with interdisciplinary team   - Patient/family teaching  - Consider wound care consult   Outcome: Progressing     Problem: Nutrition/Hydration-ADULT  Goal: Nutrient/Hydration intake appropriate for improving, restoring or maintaining nutritional needs  Description: Monitor and assess patient's nutrition/hydration status for malnutrition  Collaborate with interdisciplinary team and initiate plan and interventions as ordered  Monitor patient's weight and dietary intake as ordered or per policy  Utilize nutrition screening tool and intervene as necessary  Determine patient's food preferences and provide high-protein, high-caloric foods as appropriate       INTERVENTIONS:  - Monitor oral intake, urinary output, labs, and treatment plans  - Assess nutrition and hydration status and recommend course of action  - Evaluate amount of meals eaten  - Assist patient with eating if necessary   - Allow adequate time for meals  - Recommend/ encourage appropriate diets, oral nutritional supplements, and vitamin/mineral supplements  - Order, calculate, and assess calorie counts as needed  - Recommend, monitor, and adjust tube feedings and TPN/PPN based on assessed needs  - Assess need for intravenous fluids  - Provide specific nutrition/hydration education as appropriate  - Include patient/family/caregiver in decisions related to nutrition  Outcome: Progressing     Problem: PAIN - ADULT  Goal: Verbalizes/displays adequate comfort level or baseline comfort level  Description: Interventions:  - Encourage patient to monitor pain and request assistance  - Assess pain using appropriate pain scale  - Administer analgesics based on type and severity of pain and evaluate response  - Implement non-pharmacological measures as appropriate and evaluate response  - Consider cultural and social influences on pain and pain management  - Notify physician/advanced practitioner if interventions unsuccessful or patient reports new pain  Outcome: Progressing     Problem: GASTROINTESTINAL - ADULT  Goal: Minimal or absence of nausea and/or vomiting  Description: INTERVENTIONS:  - Administer IV fluids if ordered to ensure adequate hydration  - Maintain NPO status until nausea and vomiting are resolved  - Nasogastric tube if ordered  - Administer ordered antiemetic medications as needed  - Provide nonpharmacologic comfort measures as appropriate  - Advance diet as tolerated, if ordered  - Consider nutrition services referral to assist patient with adequate nutrition and appropriate food choices  Outcome: Progressing  Goal: Maintains adequate nutritional intake  Description: INTERVENTIONS:  - Monitor percentage of each meal consumed  - Identify factors contributing to decreased intake, treat as appropriate  - Assist with meals as needed  - Monitor I&O, weight, and lab values if indicated  - Obtain nutrition services referral as needed  Outcome: Progressing

## 2021-01-12 ENCOUNTER — APPOINTMENT (INPATIENT)
Dept: GASTROENTEROLOGY | Facility: HOSPITAL | Age: 76
DRG: 392 | End: 2021-01-12
Attending: INTERNAL MEDICINE
Payer: COMMERCIAL

## 2021-01-12 ENCOUNTER — ANESTHESIA EVENT (INPATIENT)
Dept: GASTROENTEROLOGY | Facility: HOSPITAL | Age: 76
DRG: 392 | End: 2021-01-12
Payer: COMMERCIAL

## 2021-01-12 ENCOUNTER — ANESTHESIA (INPATIENT)
Dept: GASTROENTEROLOGY | Facility: HOSPITAL | Age: 76
DRG: 392 | End: 2021-01-12
Payer: COMMERCIAL

## 2021-01-12 VITALS — HEART RATE: 107 BPM

## 2021-01-12 LAB
ANION GAP SERPL CALCULATED.3IONS-SCNC: 3 MMOL/L (ref 4–13)
BUN SERPL-MCNC: 22 MG/DL (ref 5–25)
CALCIUM SERPL-MCNC: 7.8 MG/DL (ref 8.3–10.1)
CHLORIDE SERPL-SCNC: 112 MMOL/L (ref 100–108)
CO2 SERPL-SCNC: 30 MMOL/L (ref 21–32)
CREAT SERPL-MCNC: 1.56 MG/DL (ref 0.6–1.3)
ERYTHROCYTE [DISTWIDTH] IN BLOOD BY AUTOMATED COUNT: 14.3 % (ref 11.6–15.1)
GFR SERPL CREATININE-BSD FRML MDRD: 42 ML/MIN/1.73SQ M
GLUCOSE SERPL-MCNC: 108 MG/DL (ref 65–140)
HCT VFR BLD AUTO: 36 % (ref 36.5–49.3)
HGB BLD-MCNC: 11.1 G/DL (ref 12–17)
MAGNESIUM SERPL-MCNC: 1.9 MG/DL (ref 1.6–2.6)
MCH RBC QN AUTO: 29 PG (ref 26.8–34.3)
MCHC RBC AUTO-ENTMCNC: 30.8 G/DL (ref 31.4–37.4)
MCV RBC AUTO: 94 FL (ref 82–98)
PLATELET # BLD AUTO: 183 THOUSANDS/UL (ref 149–390)
PMV BLD AUTO: 11.1 FL (ref 8.9–12.7)
POTASSIUM SERPL-SCNC: 4.1 MMOL/L (ref 3.5–5.3)
RBC # BLD AUTO: 3.83 MILLION/UL (ref 3.88–5.62)
SODIUM SERPL-SCNC: 145 MMOL/L (ref 136–145)
WBC # BLD AUTO: 8.9 THOUSAND/UL (ref 4.31–10.16)

## 2021-01-12 PROCEDURE — 85027 COMPLETE CBC AUTOMATED: CPT | Performed by: SURGERY

## 2021-01-12 PROCEDURE — 43235 EGD DIAGNOSTIC BRUSH WASH: CPT | Performed by: INTERNAL MEDICINE

## 2021-01-12 PROCEDURE — 80048 BASIC METABOLIC PNL TOTAL CA: CPT | Performed by: SURGERY

## 2021-01-12 PROCEDURE — 83735 ASSAY OF MAGNESIUM: CPT | Performed by: SURGERY

## 2021-01-12 PROCEDURE — C9113 INJ PANTOPRAZOLE SODIUM, VIA: HCPCS | Performed by: SURGERY

## 2021-01-12 PROCEDURE — 99232 SBSQ HOSP IP/OBS MODERATE 35: CPT | Performed by: SURGERY

## 2021-01-12 RX ORDER — PROPOFOL 10 MG/ML
INJECTION, EMULSION INTRAVENOUS AS NEEDED
Status: DISCONTINUED | OUTPATIENT
Start: 2021-01-12 | End: 2021-01-12

## 2021-01-12 RX ORDER — SODIUM CHLORIDE, SODIUM LACTATE, POTASSIUM CHLORIDE, CALCIUM CHLORIDE 600; 310; 30; 20 MG/100ML; MG/100ML; MG/100ML; MG/100ML
INJECTION, SOLUTION INTRAVENOUS CONTINUOUS PRN
Status: DISCONTINUED | OUTPATIENT
Start: 2021-01-12 | End: 2021-01-12

## 2021-01-12 RX ORDER — LIDOCAINE HYDROCHLORIDE 10 MG/ML
INJECTION, SOLUTION EPIDURAL; INFILTRATION; INTRACAUDAL; PERINEURAL AS NEEDED
Status: DISCONTINUED | OUTPATIENT
Start: 2021-01-12 | End: 2021-01-12

## 2021-01-12 RX ORDER — LANOLIN ALCOHOL/MO/W.PET/CERES
3 CREAM (GRAM) TOPICAL
Status: DISCONTINUED | OUTPATIENT
Start: 2021-01-12 | End: 2021-01-14 | Stop reason: HOSPADM

## 2021-01-12 RX ORDER — PROPOFOL 10 MG/ML
INJECTION, EMULSION INTRAVENOUS CONTINUOUS PRN
Status: DISCONTINUED | OUTPATIENT
Start: 2021-01-12 | End: 2021-01-12

## 2021-01-12 RX ADMIN — DEXTROSE, SODIUM CHLORIDE, AND POTASSIUM CHLORIDE 125 ML/HR: 5; .45; .15 INJECTION INTRAVENOUS at 23:21

## 2021-01-12 RX ADMIN — HEPARIN SODIUM 5000 UNITS: 5000 INJECTION INTRAVENOUS; SUBCUTANEOUS at 23:13

## 2021-01-12 RX ADMIN — PROPOFOL 30 MG: 10 INJECTION, EMULSION INTRAVENOUS at 14:48

## 2021-01-12 RX ADMIN — SODIUM CHLORIDE, SODIUM LACTATE, POTASSIUM CHLORIDE, AND CALCIUM CHLORIDE: .6; .31; .03; .02 INJECTION, SOLUTION INTRAVENOUS at 14:41

## 2021-01-12 RX ADMIN — PROPOFOL 120 MCG/KG/MIN: 10 INJECTION, EMULSION INTRAVENOUS at 14:54

## 2021-01-12 RX ADMIN — PROPOFOL 60 MG: 10 INJECTION, EMULSION INTRAVENOUS at 14:46

## 2021-01-12 RX ADMIN — PROPOFOL 30 MG: 10 INJECTION, EMULSION INTRAVENOUS at 14:50

## 2021-01-12 RX ADMIN — DEXTROSE, SODIUM CHLORIDE, AND POTASSIUM CHLORIDE 125 ML/HR: 5; .45; .15 INJECTION INTRAVENOUS at 11:47

## 2021-01-12 RX ADMIN — MELATONIN 3 MG: at 23:13

## 2021-01-12 RX ADMIN — HEPARIN SODIUM 5000 UNITS: 5000 INJECTION INTRAVENOUS; SUBCUTANEOUS at 05:59

## 2021-01-12 RX ADMIN — PANTOPRAZOLE SODIUM 40 MG: 40 INJECTION, POWDER, FOR SOLUTION INTRAVENOUS at 09:30

## 2021-01-12 RX ADMIN — Medication 1 SPRAY: at 23:20

## 2021-01-12 RX ADMIN — LIDOCAINE HYDROCHLORIDE 50 MG: 10 INJECTION, SOLUTION EPIDURAL; INFILTRATION; INTRACAUDAL at 14:46

## 2021-01-12 NOTE — PLAN OF CARE
Problem: Nutrition/Hydration-ADULT  Goal: Nutrient/Hydration intake appropriate for improving, restoring or maintaining nutritional needs  Description: Monitor and assess patient's nutrition/hydration status for malnutrition  Collaborate with interdisciplinary team and initiate plan and interventions as ordered  Monitor patient's weight and dietary intake as ordered or per policy  Utilize nutrition screening tool and intervene as necessary  Determine patient's food preferences and provide high-protein, high-caloric foods as appropriate  INTERVENTIONS:  - Monitor oral intake, urinary output, labs, and treatment plans  - Assess nutrition and hydration status and recommend course of action  - Evaluate amount of meals eaten  - Assist patient with eating if necessary   - Allow adequate time for meals  - Recommend/ encourage appropriate diets, oral nutritional supplements, and vitamin/mineral supplements  - Order, calculate, and assess calorie counts as needed  - Recommend, monitor, and adjust tube feedings and TPN/PPN based on assessed needs  - Assess need for intravenous fluids  - Provide specific nutrition/hydration education as appropriate  - Include patient/family/caregiver in decisions related to nutrition  Outcome: Not Progressing   NPO for test, will monitor diet advancement/tolerance

## 2021-01-12 NOTE — ANESTHESIA PREPROCEDURE EVALUATION
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      Distal stomach, pylorus, and proximal duodenum contained within intrathoracic hernia  Large volume NG output upon initial placement  Physical Exam    Airway    Mallampati score: I  TM Distance: >3 FB  Neck ROM: full     Dental       Cardiovascular      Pulmonary      Other Findings  NGT in place and functioning      Anesthesia Plan  ASA Score- 3     Anesthesia Type- IV sedation with anesthesia with ASA Monitors  Additional Monitors:   Airway Plan:     Comment: Possible GETA if significant stomach contents and/or concern for aspiration once scope underway  Plan Factors-Exercise tolerance (METS): >4 METS  Chart reviewed  Patient summary reviewed  Induction- intravenous  Postoperative Plan-     Informed Consent- Anesthetic plan and risks discussed with patient  I personally reviewed this patient with the CRNA  Discussed and agreed on the Anesthesia Plan with the CRNA  Marco Antonio Berg

## 2021-01-12 NOTE — ANESTHESIA POSTPROCEDURE EVALUATION
Post-Op Assessment Note    CV Status:  Stable  Pain Score: 0    Pain management: adequate     Mental Status:  Alert and awake   Hydration Status:  Euvolemic   PONV Controlled:  Controlled   Airway Patency:  Patent      Post Op Vitals Reviewed: Yes      Staff: CRNA, Anesthesiologist         No complications documented      /81 (01/12/21 1509)    Temp (!) 97 3 °F (36 3 °C) (01/12/21 1509)    Pulse 99 (01/12/21 1509)   Resp 16 (01/12/21 1509)    SpO2 95 % (01/12/21 1509)

## 2021-01-12 NOTE — PROGRESS NOTES
Progress Note - general Surgery   Nayely Tamayo 66 y o  male MRN: [id number]  Unit/Bed#: W -01 Encounter: [id number]    Assessment:  66 M w/ PMH of hiatal hernia and Lang's (last EGD 2018) w/ imaging concerning for type IV hiatal hernia w/ gastric outlet obstruction    Plan:  Maintain NG/NPO  IVF  PPI  F/u GI- EGD today  DVT ppx  Pain/Nausea Control  OOB as tolerated  Transfer to [facility] Thoracics if patient does not improve with conservative measures    Subjective/Objective     Subjective:   Denies abdominal pain, nausea or vomiting, no other complaints    Objective:    Blood pressure 144/98, pulse 82, temperature 97 7 °F (36 5 °C), resp  rate 16, height 5' 11" (1 803 m), weight 81 3 kg (179 lb 3 7 oz), SpO2 90 %  ,Body mass index is 25 kg/m²        Intake/Output Summary (Last 24 hours) at 1/12/2021 0656  Last data filed at 1/12/2021 0559  Gross per 24 hour   Intake 1896 5 ml   Output 1800 ml   Net 96 5 ml       Invasive Devices     Peripheral Intravenous Line            Peripheral IV 01/10/21 Right Antecubital 1 day          Drain            NG/OG/Enteral Tube Nasogastric 16 Fr Left nares 1 day                Physical Exam:   Gen:  NAD  CV:  warm, well-perfused  Lungs: nl effort  Abd:  soft, NT/ND, NG in place, 300ml for 24 hours  Ext:  no CCE  Neuro: A&Ox3     Results from last 7 days   Lab Units 01/12/21  0516 01/11/21  0609 01/10/21  1854   WBC Thousand/uL 8 90 15 58* 16 00*   HEMOGLOBIN g/dL 11 1* 12 7 15 0   HEMATOCRIT % 36 0* 40 1 46 7   PLATELETS Thousands/uL 183 243 288     Results from last 7 days   Lab Units 01/12/21  0516 01/11/21  0609 01/10/21  1854   POTASSIUM mmol/L 4 1 3 3* 3 7   CHLORIDE mmol/L 112* 109* 104   CO2 mmol/L 30 36* 35*   BUN mg/dL 22 31* 32*   CREATININE mg/dL 1 56* 1 98* 2 31*   CALCIUM mg/dL 7 8* 8 2* 9 0

## 2021-01-13 ENCOUNTER — APPOINTMENT (INPATIENT)
Dept: RADIOLOGY | Facility: HOSPITAL | Age: 76
DRG: 392 | End: 2021-01-13
Payer: COMMERCIAL

## 2021-01-13 LAB
ANION GAP SERPL CALCULATED.3IONS-SCNC: 5 MMOL/L (ref 4–13)
BASOPHILS # BLD AUTO: 0.03 THOUSANDS/ΜL (ref 0–0.1)
BASOPHILS NFR BLD AUTO: 0 % (ref 0–1)
BUN SERPL-MCNC: 14 MG/DL (ref 5–25)
CALCIUM SERPL-MCNC: 7.8 MG/DL (ref 8.3–10.1)
CHLORIDE SERPL-SCNC: 108 MMOL/L (ref 100–108)
CO2 SERPL-SCNC: 27 MMOL/L (ref 21–32)
CREAT SERPL-MCNC: 1.3 MG/DL (ref 0.6–1.3)
EOSINOPHIL # BLD AUTO: 0.2 THOUSAND/ΜL (ref 0–0.61)
EOSINOPHIL NFR BLD AUTO: 2 % (ref 0–6)
ERYTHROCYTE [DISTWIDTH] IN BLOOD BY AUTOMATED COUNT: 13.7 % (ref 11.6–15.1)
GFR SERPL CREATININE-BSD FRML MDRD: 53 ML/MIN/1.73SQ M
GLUCOSE SERPL-MCNC: 119 MG/DL (ref 65–140)
HCT VFR BLD AUTO: 37.7 % (ref 36.5–49.3)
HGB BLD-MCNC: 11.7 G/DL (ref 12–17)
IMM GRANULOCYTES # BLD AUTO: 0.04 THOUSAND/UL (ref 0–0.2)
IMM GRANULOCYTES NFR BLD AUTO: 0 % (ref 0–2)
LYMPHOCYTES # BLD AUTO: 1.23 THOUSANDS/ΜL (ref 0.6–4.47)
LYMPHOCYTES NFR BLD AUTO: 13 % (ref 14–44)
MCH RBC QN AUTO: 28.5 PG (ref 26.8–34.3)
MCHC RBC AUTO-ENTMCNC: 31 G/DL (ref 31.4–37.4)
MCV RBC AUTO: 92 FL (ref 82–98)
MONOCYTES # BLD AUTO: 0.9 THOUSAND/ΜL (ref 0.17–1.22)
MONOCYTES NFR BLD AUTO: 10 % (ref 4–12)
NEUTROPHILS # BLD AUTO: 6.92 THOUSANDS/ΜL (ref 1.85–7.62)
NEUTS SEG NFR BLD AUTO: 75 % (ref 43–75)
NRBC BLD AUTO-RTO: 0 /100 WBCS
PLATELET # BLD AUTO: 196 THOUSANDS/UL (ref 149–390)
PMV BLD AUTO: 11.4 FL (ref 8.9–12.7)
POTASSIUM SERPL-SCNC: 4 MMOL/L (ref 3.5–5.3)
RBC # BLD AUTO: 4.11 MILLION/UL (ref 3.88–5.62)
SODIUM SERPL-SCNC: 140 MMOL/L (ref 136–145)
WBC # BLD AUTO: 9.32 THOUSAND/UL (ref 4.31–10.16)

## 2021-01-13 PROCEDURE — 85025 COMPLETE CBC W/AUTO DIFF WBC: CPT | Performed by: SURGERY

## 2021-01-13 PROCEDURE — 80048 BASIC METABOLIC PNL TOTAL CA: CPT | Performed by: SURGERY

## 2021-01-13 PROCEDURE — C9113 INJ PANTOPRAZOLE SODIUM, VIA: HCPCS | Performed by: SURGERY

## 2021-01-13 PROCEDURE — 99232 SBSQ HOSP IP/OBS MODERATE 35: CPT | Performed by: SURGERY

## 2021-01-13 PROCEDURE — 74220 X-RAY XM ESOPHAGUS 1CNTRST: CPT

## 2021-01-13 RX ORDER — ACETAMINOPHEN 325 MG/1
650 TABLET ORAL EVERY 6 HOURS PRN
Refills: 0 | Status: CANCELLED
Start: 2021-01-13

## 2021-01-13 RX ADMIN — MELATONIN 3 MG: at 21:17

## 2021-01-13 RX ADMIN — DEXTROSE, SODIUM CHLORIDE, AND POTASSIUM CHLORIDE 125 ML/HR: 5; .45; .15 INJECTION INTRAVENOUS at 08:25

## 2021-01-13 RX ADMIN — HEPARIN SODIUM 5000 UNITS: 5000 INJECTION INTRAVENOUS; SUBCUTANEOUS at 05:13

## 2021-01-13 RX ADMIN — HEPARIN SODIUM 5000 UNITS: 5000 INJECTION INTRAVENOUS; SUBCUTANEOUS at 14:41

## 2021-01-13 RX ADMIN — PANTOPRAZOLE SODIUM 40 MG: 40 INJECTION, POWDER, FOR SOLUTION INTRAVENOUS at 08:25

## 2021-01-13 RX ADMIN — DEXTROSE, SODIUM CHLORIDE, AND POTASSIUM CHLORIDE 100 ML/HR: 5; .45; .15 INJECTION INTRAVENOUS at 22:40

## 2021-01-13 RX ADMIN — HEPARIN SODIUM 5000 UNITS: 5000 INJECTION INTRAVENOUS; SUBCUTANEOUS at 21:17

## 2021-01-13 NOTE — DISCHARGE INSTRUCTIONS
Seek immediate medical attention if you become nauseous, experience vomiting or become bloated  Hiatal Hernia   WHAT YOU NEED TO KNOW:   A hiatal hernia is a condition that causes part of your stomach to bulge through the hiatus (small opening) in your diaphragm  The part of the stomach may move up and down, or it may get trapped above the diaphragm  DISCHARGE INSTRUCTIONS:   Seek care immediately if:   · You have severe abdominal pain  · You try to vomit but nothing comes out (retching)  · You have severe chest pain and sudden trouble breathing  · Your bowel movements are black or bloody  · Your vomit looks like coffee grounds or has blood in it  Contact your healthcare provider if:   · Your symptoms are getting worse  · You have nausea, and you are vomiting  · You are losing weight without trying  · You have questions or concerns about your condition or care  Medicines:   · Medicines  may be given to relieve heartburn symptoms  These medicines help to decrease or block stomach acid  You may also be given medicines that help to tighten the esophageal sphincter  · Take your medicine as directed  Contact your healthcare provider if you think your medicine is not helping or if you have side effects  Tell him or her if you are allergic to any medicine  Keep a list of the medicines, vitamins, and herbs you take  Include the amounts, and when and why you take them  Bring the list or the pill bottles to follow-up visits  Carry your medicine list with you in case of an emergency  Follow up with your healthcare provider as directed:  Write down your questions so you remember to ask them during your visits  Self care:   · Avoid foods that make your symptoms worse  These may include spicy foods, fruit juices, alcohol, caffeine, chocolate, and mint  · Eat several small meals during the day    Small meals give your stomach less food to digest     · Avoid lying down and bending forward after you eat  Do not eat meals 2 to 3 hours before bedtime  This decreases your risk for reflux  · Maintain a healthy weight  If you are overweight, weight loss may help relieve your symptoms  · Sleep with your head elevated  at least 6 inches  · Do not smoke  Smoking can increase your symptoms of heartburn  © Copyright [address] Information is for End User's use only and may not be sold, redistributed or otherwise used for commercial purposes  All illustrations and images included in CareNotes® are the copyrighted property of A D A M , Inc  or [facility] Olive Sigala   The above information is an  only  It is not intended as medical advice for individual conditions or treatments  Talk to your doctor, nurse or pharmacist before following any medical regimen to see if it is safe and effective for you  Soft Diet   WHAT YOU NEED TO KNOW:   A soft diet is made up of foods that are soft and easy to chew and swallow  These foods may be chopped, ground, mashed, pureed, and moist  You may need to follow this diet if you have had certain types of surgery, such as head, neck, or stomach surgery  You may also need to follow this diet if you have problems with your teeth or mouth that make it hard for you to chew or swallow food  Your dietitian will tell you how to follow this diet and what consistency of liquids you may have  DISCHARGE INSTRUCTIONS:   How to prepare soft food:   · Cut food into small pieces that are ½ inch or smaller in size because they are easier to swallow  · Use chicken broth, beef broth, gravy, or sauces to cook or moisten meats and vegetables  Cook vegetables until they are soft enough to be mashed with a fork  · Use a  to grind or puree foods to make them easier to chew and swallow  · Use fruit juice to blend fruit  · Strain soups that have pieces of meat or vegetables that are larger than ½ inch      Foods you can include:   · Breads, cereals, rice, and pasta:      ? Breads, muffins, pancakes, or waffles moistened with syrup, jelly, margarine or butter    ? Moist dry or cooked cereal    ? Macaroni, pasta, noodles, or rice    ? Saltine crackers moistened in soup or other liquid    · Fruits and vegetables:      ? Applesauce or canned fruit without seeds or skin    ? Cooked fruits or ripe, soft peeled fruits, such as bananas, peaches, or melon    ? Soft, well-cooked vegetables without seeds or skin    · Meat and other protein sources:      ? Poached, scrambled, or cooked eggs    ? Moist, tender meat, fish, or poultry that is ground or chopped into small pieces    ? Soups with small soft pieces of vegetables and meat    ? Tofu or well-cooked, slightly mashed, moist legumes, such as baked beans    · Dairy:      ? Cheese (in sauces or melted in other dishes), cottage cheese, or ricotta cheese    ? Milk or milk drinks, milkshakes    ? Ice cream, sherbet, or frozen yogurt without fruit or nuts    ? Yogurt (plain or with soft fruits)    · Desserts:      ? Gelatin dessert with soft canned fruit, pudding, or custard    ? Fruit cobbler with soft breading or crumb mixture (no seeds or nuts), or fruit pie with soft bottom crust only    ? Soft, moist cake or cookie that has been moistened in milk, coffee, or other liquid    Foods to avoid:  Avoid any foods that are hard for you to chew or swallow, such as the following:  · Starches:      ? Dry bread, toast, crackers, and cereal    ? Cereal, cake, and breads with coconut, dried fruit, nuts, and other seeds    ? Corn, potato, and tortilla chips and taco shells    ? Breads with tough crusts, such as bagels, [place] bread, and sourdough bread    ? Popcorn    · Vegetables:      ? Corn and peas    ? Raw, hard vegetables that cannot be mashed easily, such as carrots, broccoli, cauliflower, and celery    ?  Crisp fried vegetables, such as potatoes    · Fruits:      ? Raw, crisp fruits, such as apples and pears and dried fruit    ? Stringy fruits, such as pineapple and christen    ? Cooked fruit with skin and seeds    · Dairy, meats, and protein foods:      ? Yogurt or ice cream with coconut, nuts, and granola    ? Dry meats (beef jerky) and tough meats (such as chance, sausage, hot dogs, and bratwurst)    ? Casseroles with large chunks of meat    ? Peanut butter (creamy and crunchy)    © Copyright [employer] 2020 Information is for End User's use only and may not be sold, redistributed or otherwise used for commercial purposes  All illustrations and images included in CareNotes® are the copyrighted property of A D A M , Inc  or [facility] Olive Alvarez  The above information is an  only  It is not intended as medical advice for individual conditions or treatments  Talk to your doctor, nurse or pharmacist before following any medical regimen to see if it is safe and effective for you

## 2021-01-13 NOTE — PLAN OF CARE
Problem: Potential for Falls  Goal: Patient will remain free of falls  Description: INTERVENTIONS:  - Assess patient frequently for physical needs  -  Identify cognitive and physical deficits and behaviors that affect risk of falls  -  [place] fall precautions as indicated by assessment   - Educate patient/family on patient safety including physical limitations  - Instruct patient to call for assistance with activity based on assessment  - Modify environment to reduce risk of injury  - Consider OT/PT consult to assist with strengthening/mobility  Outcome: Progressing     Problem: Prexisting or High Potential for Compromised Skin Integrity  Goal: Skin integrity is maintained or improved  Description: INTERVENTIONS:  - Identify patients at risk for skin breakdown  - Assess and monitor skin integrity  - Assess and monitor nutrition and hydration status  - Monitor labs   - Assess for incontinence   - Turn and reposition patient  - Assist with mobility/ambulation  - Relieve pressure over bony prominences  - Avoid friction and shearing  - Provide appropriate hygiene as needed including keeping skin clean and dry  - Evaluate need for skin moisturizer/barrier cream  - Collaborate with interdisciplinary team   - Patient/family teaching  - Consider wound care consult   Outcome: Progressing     Problem: Nutrition/Hydration-ADULT  Goal: Nutrient/Hydration intake appropriate for improving, restoring or maintaining nutritional needs  Description: Monitor and assess patient's nutrition/hydration status for malnutrition  Collaborate with interdisciplinary team and initiate plan and interventions as ordered  Monitor patient's weight and dietary intake as ordered or per policy  Utilize nutrition screening tool and intervene as necessary  Determine patient's food preferences and provide high-protein, high-caloric foods as appropriate       INTERVENTIONS:  - Monitor oral intake, urinary output, labs, and treatment plans  - Assess nutrition and hydration status and recommend course of action  - Evaluate amount of meals eaten  - Assist patient with eating if necessary   - Allow adequate time for meals  - Recommend/ encourage appropriate diets, oral nutritional supplements, and vitamin/mineral supplements  - Order, calculate, and assess calorie counts as needed  - Recommend, monitor, and adjust tube feedings and TPN/PPN based on assessed needs  - Assess need for intravenous fluids  - Provide specific nutrition/hydration education as appropriate  - Include patient/family/caregiver in decisions related to nutrition  Outcome: Progressing     Problem: PAIN - ADULT  Goal: Verbalizes/displays adequate comfort level or baseline comfort level  Description: Interventions:  - Encourage patient to monitor pain and request assistance  - Assess pain using appropriate pain scale  - Administer analgesics based on type and severity of pain and evaluate response  - Implement non-pharmacological measures as appropriate and evaluate response  - Consider cultural and social influences on pain and pain management  - Notify physician/advanced practitioner if interventions unsuccessful or patient reports new pain  Outcome: Progressing     Problem: GASTROINTESTINAL - ADULT  Goal: Minimal or absence of nausea and/or vomiting  Description: INTERVENTIONS:  - Administer IV fluids if ordered to ensure adequate hydration  - Maintain NPO status until nausea and vomiting are resolved  - Nasogastric tube if ordered  - Administer ordered antiemetic medications as needed  - Provide nonpharmacologic comfort measures as appropriate  - Advance diet as tolerated, if ordered  - Consider nutrition services referral to assist patient with adequate nutrition and appropriate food choices  Outcome: Progressing  Goal: Maintains adequate nutritional intake  Description: INTERVENTIONS:  - Monitor percentage of each meal consumed  - Identify factors contributing to decreased intake, treat as appropriate  - Assist with meals as needed  - Monitor I&O, weight, and lab values if indicated  - Obtain nutrition services referral as needed  Outcome: Progressing

## 2021-01-13 NOTE — PROGRESS NOTES
Progress Note - General Surgery   Roz No 66 y o  male MRN: [id number]  Unit/Bed#: W -01 Encounter: [id number]    Assessment:  66 M w/ PMH of hiatal hernia and Lang's (last EGD 2018) w/ imaging concerning for type IV hiatal hernia w/ gastric outlet obstruction    Plan:  Maintain NGT/NPO-- likely remove today  IVF  PPI  F/u barium swallow today  DVT ppx  Pain/Nausea Control  OOB as tolerated  Transfer to B Thoracics if patient does not improve with conservative measures    Subjective/Objective     Subjective:   No acute events overnight  EGD done yesterday  GI with concerns for volvulus  To obtain Barium swallow today  Having bowel function  Objective:    Blood pressure 160/80, pulse 66, temperature 98 3 °F (36 8 °C), temperature source Oral, resp  rate 16, height 5' 11" (1 803 m), weight 81 3 kg (179 lb 3 7 oz), SpO2 94 %  ,Body mass index is 25 kg/m²  Intake/Output Summary (Last 24 hours) at 1/13/2021 0813  Last data filed at 1/13/2021 0748  Gross per 24 hour   Intake 2580 ml   Output 1475 ml   Net 1105 ml       Invasive Devices     Peripheral Intravenous Line            Peripheral IV 01/10/21 Right Antecubital 2 days          Drain            NG/OG/Enteral Tube Nasogastric 16 Fr Left nares 2 days                Physical Exam:   Gen:  NAD  HEENT: NCAT  MMM  CV: well perfused, pulses palpable  Lungs: Normal respiratory effort  Abd: soft, nt/nd  Skin: warm/ dry  Extremities: no peripheral edema, no clubbing or cyanosis  Neuro:  AxO x3      Results from last 7 days   Lab Units 01/13/21  0547 01/12/21  0516 01/11/21  0609   WBC Thousand/uL 9 32 8 90 15 58*   HEMOGLOBIN g/dL 11 7* 11 1* 12 7   HEMATOCRIT % 37 7 36 0* 40 1   PLATELETS Thousands/uL 196 183 243     Results from last 7 days   Lab Units 01/13/21  0547 01/12/21  0516 01/11/21  0609   POTASSIUM mmol/L 4 0 4 1 3 3*   CHLORIDE mmol/L 108 112* 109*   CO2 mmol/L 27 30 36*   BUN mg/dL 14 22 31*   CREATININE mg/dL 1 30 1 56* 1 98*   CALCIUM mg/dL 7 8* 7 8* 8 2*            I have personally reviewed pertinent films in PACS      Medications:   Scheduled Meds:  Current Facility-Administered Medications   Medication Dose Route Frequency Provider Last Rate    acetaminophen  650 mg Oral Q6H PRN Anshul Mcghee MD      dextrose 5 % and sodium chloride 0 45 % with KCl 20 mEq/L  125 mL/hr Intravenous Continuous Lois Bamberger,  mL/hr (01/12/21 2321)    heparin (porcine)  5,000 Units Subcutaneous [facility] Anshul Mcghee MD      hydrALAZINE  5 mg Intravenous Q6H PRN Anshul Mcghee MD      HYDROmorphone  0 2 mg Intravenous Q3H PRN Anshul Mcghee MD      HYDROmorphone  0 4 mg Intravenous Q3H PRN Anshul Mcghee MD      HYDROmorphone  0 6 mg Intravenous Q3H PRN Anshul Mcghee MD      melatonin  3 mg Oral HS Anshul Mcghee MD      ondansetron  4 mg Intravenous Q6H PRN Anshul Mcghee MD      pantoprazole  40 mg Intravenous Q24H [facility] Anshul Mcghee MD      phenol  1 spray Mouth/Throat Q2H PRN Rustam Guzman MD       Continuous Infusions:dextrose 5 % and sodium chloride 0 45 % with KCl 20 mEq/L, 125 mL/hr, Last Rate: 125 mL/hr (01/12/21 2321)      PRN Meds:  acetaminophen, 650 mg, Q6H PRN  hydrALAZINE, 5 mg, Q6H PRN  HYDROmorphone, 0 2 mg, Q3H PRN  HYDROmorphone, 0 4 mg, Q3H PRN  HYDROmorphone, 0 6 mg, Q3H PRN  ondansetron, 4 mg, Q6H PRN  phenol, 1 spray, Q2H PRN      VTE Pharmacologic Prophylaxis: Heparin  VTE Mechanical Prophylaxis: sequential compression device

## 2021-01-14 VITALS
DIASTOLIC BLOOD PRESSURE: 80 MMHG | WEIGHT: 179.23 LBS | HEART RATE: 66 BPM | BODY MASS INDEX: 25.09 KG/M2 | TEMPERATURE: 98.6 F | SYSTOLIC BLOOD PRESSURE: 142 MMHG | OXYGEN SATURATION: 96 % | RESPIRATION RATE: 18 BRPM | HEIGHT: 71 IN

## 2021-01-14 PROCEDURE — C9113 INJ PANTOPRAZOLE SODIUM, VIA: HCPCS | Performed by: SURGERY

## 2021-01-14 PROCEDURE — NC001 PR NO CHARGE: Performed by: SURGERY

## 2021-01-14 RX ADMIN — PANTOPRAZOLE SODIUM 40 MG: 40 INJECTION, POWDER, FOR SOLUTION INTRAVENOUS at 08:30

## 2021-01-14 RX ADMIN — HEPARIN SODIUM 5000 UNITS: 5000 INJECTION INTRAVENOUS; SUBCUTANEOUS at 05:22

## 2021-01-14 NOTE — PROGRESS NOTES
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 2018) w/ imaging concerning for type IV hiatal hernia w/ gastric outlet obstruction      Plan:  Diet Regular; Regular House; Fl Liq [place] [employer]  Will likely advance diet as tolerated today  Activity as tolerated, ambulate  PPx: SQH, PPI  Pain and Nausea control PRN  Likely outpatient follow up with Thoracic Surgery for the Type III PEH      Subjective/Objective     Subjective: No acute events overnight  Upper GI swallow showed no gastric outlet obstruction yesterday and patient was started on full liquid diet with toast and crackers  No abdominal pain or nausea  Objective:    Blood pressure 161/84, pulse 72, temperature 97 8 °F (36 6 °C), temperature source Oral, resp  rate 18, height 5' 11" (1 803 m), weight 81 3 kg (179 lb 3 7 oz), SpO2 95 %  ,Body mass index is 25 kg/m²  Intake/Output Summary (Last 24 hours) at 1/14/2021 0621  Last data filed at 1/14/2021 0524  Gross per 24 hour   Intake 2252 5 ml   Output 2475 ml   Net -222 5 ml       Invasive Devices     Peripheral Intravenous Line            Peripheral IV 01/13/21 Dorsal (posterior); Left Forearm less than 1 day                Physical Exam:   Gen: NAD, Comfortable  Neuro: A&O, No focal deficits  Head: Normal Cephalic, Atraumatic  Eye: EOMI, PERRLA, No scleral icterus  Neck: Supple, No JVD, Midline trachea  CV: RRR, Cap refill <2 sec  Pulm: Normal work of breathing, no respiratory distress  Abd: Soft, Non-Distended, Non-Tender  Ext: No edema, Non-tender  Skin: warm, dry, intact        Results from last 7 days   Lab Units 01/13/21  0547 01/12/21  0516 01/11/21  0609   WBC Thousand/uL 9 32 8 90 15 58*   HEMOGLOBIN g/dL 11 7* 11 1* 12 7   HEMATOCRIT % 37 7 36 0* 40 1   PLATELETS Thousands/uL 196 183 243     Results from last 7 days   Lab Units 01/13/21  0547 01/12/21  0516 01/11/21  0609   POTASSIUM mmol/L 4 0 4 1 3 3*   CHLORIDE mmol/L 108 112* 109*   CO2 mmol/L 27 30 36*   BUN mg/dL 14 22 31*   CREATININE mg/dL 1 30 1 56* 1 98*   CALCIUM mg/dL 7 8* 7 8* 8 2*            Procedure: Fl Barium Swallow    Result Date: 1/13/2021  Narrative: BARIUM SWALLOW-ESOPHAGRAM INDICATION:   evaluation of hiatal hernia  evaluation of gastric emptying  COMPARISON:  CT 1/10/2021, and notes from endoscopic study 1/12/2021  IMAGES:  252  (this includes cine capture images) FLUOROSCOPY TIME:   1 6 minutes  TECHNIQUE: The patient was given effervescent granules and barium by mouth and images of the esophagus were obtained  FINDINGS: Proximal and mid esophagus are unremarkable  Examination demonstrates a very large complex hiatal hernia  While a portion of the stomach remains below the left hemidiaphragm, a large portion of the stomach is also located above the diaphragm, to the right of midline  The antrum and duodenum are displaced superiorly toward the hiatus  Currently, there is no evidence of inflammatory change or gastric outlet obstruction  Gastroesophageal reflux was intermittently seen      Impression: Large complex hiatal hernia  Essentially, this represents a large type III but predominantly paraesophageal hernia  Fundus remains below the diaphragm, but there is a transverse orientation of the remainder of the stomach above the diaphragm, which extends to the right of midline  The gastric outlet and duodenum are displaced superiorly  There is no evidence of gastric outlet obstruction at this time   Workstation performed: [id number]       Medications:   Scheduled Meds:  Current Facility-Administered Medications   Medication Dose Route Frequency Provider Last Rate    acetaminophen  650 mg Oral Q6H PRN Anshul Mcghee MD      dextrose 5 % and sodium chloride 0 45 % with KCl 20 mEq/L  100 mL/hr Intravenous Continuous Rustam Guzman  mL/hr (01/13/21 6720)    heparin (porcine)  5,000 Units Subcutaneous [facility] Beau Painting MD      hydrALAZINE  5 mg Intravenous Q6H PRN Beau Painting MD      HYDROmorphone  0 2 mg Intravenous Q3H PRN Beau Painting MD      HYDROmorphone  0 4 mg Intravenous Q3H PRN Beau Painting MD      HYDROmorphone  0 6 mg Intravenous Q3H PRN Beau Painting MD      melatonin  3 mg Oral HS Beau Painting MD      ondansetron  4 mg Intravenous Q6H PRN Beau Painting MD      pantoprazole  40 mg Intravenous Q24H [facility] Beau Painting MD      phenol  1 spray Mouth/Throat Q2H PRN Lizzie Robles MD       Continuous Infusions:dextrose 5 % and sodium chloride 0 45 % with KCl 20 mEq/L, 100 mL/hr, Last Rate: 100 mL/hr (01/13/21 2240)      PRN Meds:  acetaminophen, 650 mg, Q6H PRN  hydrALAZINE, 5 mg, Q6H PRN  HYDROmorphone, 0 2 mg, Q3H PRN  HYDROmorphone, 0 4 mg, Q3H PRN  HYDROmorphone, 0 6 mg, Q3H PRN  ondansetron, 4 mg, Q6H PRN  phenol, 1 spray, Q2H PRN      VTE Pharmacologic Prophylaxis: Heparin  VTE Mechanical Prophylaxis: sequential compression device

## 2021-01-14 NOTE — DISCHARGE SUMMARY
Discharge Summary - Lana Vasquez 66 y o  male MRN: [id number]    Unit/Bed#: W -05 Encounter: [id number]    Admission Date:   Admission Orders (From admission, onward)     Ordered        01/10/21 2314  Inpatient Admission  Once                     Admitting Diagnosis: Dehydration [E86 0]  Hiatal hernia [K44 9]  Gastric outlet obstruction [K31 1]  ISAAC (acute kidney injury) ([facility] [address] ) [N17 9]    HPI from 1/11: Lana Vasquez is a 66 y o  male who present presented with severe abdominal pain, nausea and vomiting  He was initially concerned for food poisoning  States the pain came on suddenly and occurred simultaneously with the nausea and vomiting  Patient was unable to keep anything down, and pain was worsening so he came to the emergency department for further evaluation this evening  In the ED Workup shows white blood cell count of 16; lactic acid of 2 1 creatinine of 2 3; his CT shows still a bit distended filled with fluid  Hiatal hernia containing the distal stomach, pyloric sphincter, and proximal duodenum      Chart review reveals EGDs in 2017 and 2018  In 2018 the EGD notes a large hiatal hernia of 7 cm  It also notes Lang's esophagus  Procedures Performed:   Orders Placed This Encounter   Procedures    ED ECG Documentation Only       Summary of Hospital Course:  Patient was admitted for conservative management of a type 4 hiatal hernia on 1/11  Case was discussed with thoracic surgery attending and general surgery attending  The plan was made to treat conservatively but transfer would be an option if patient did not respond well  An NG tube was placed with approximately 1 7 L output upon insertion  IV fluids were started  Patient responded well to conservative management  GI was consulted for an EGD  This was done on 1/12  GI was concern for partial volvulus and asked for further thoracic surgery input  Thoracic surgery advised a barium swallow and close outpatient follow-up    Barium swallow was completed on 01/13 with findings listed below  The NG tube was able to be removed  Patient tolerated advancement in diet  Thoracic surgery referral was completed on discharge  Patient was medically ready for discharge on 01/14    Significant Findings, Care, Treatment and Services Provided:    1/11 CT AP:  Impression-Stomach is distended and filled with fluid  There is a hiatal hernia containing the distal stomach, pyloric sphincter and proximal duodenum; I suspect this results in gastric outlet obstruction  1/12 EGD:  Impression-Large, complex hiatal hernia with portions of the antrum, duodenum herniated through the diaphragmatic opening  Appears to have some component of partial volvulus however lumen was patent, scope was advanced into the duodenum  Reposition the NG tube so it was positioned into the duodenum      1/13 Barium Swallow:Large complex hiatal hernia  Essentially, this represents a large type III but predominantly paraesophageal hernia  Fundus remains below the diaphragm, but there is a transverse orientation of the remainder of the stomach above the diaphragm, which   extends to the right of midline      The gastric outlet and duodenum are displaced superiorly      There is no evidence of gastric outlet obstruction at this time        Complications:  None    Discharge Diagnosis:  Type 4 hiatal hernia status post conservative therapy  Resolved Problems  Date Reviewed: 1/13/2021    None          Condition at Discharge: good         Discharge instructions/Information to patient and family:   See after visit summary for information provided to patient and family  Provisions for Follow-Up Care:  See after visit summary for information related to follow-up care and any pertinent home health orders  PCP: Kirk Mayer DO    Disposition: Home    Planned Readmission: No      Discharge Statement   I spent 15 minutes discharging the patient   This time was spent on the day of discharge  I had direct contact with the patient on the day of discharge  Additional documentation is required if more than 30 minutes were spent on discharge  Discharge Medications:  See after visit summary for reconciled discharge medications provided to patient and family

## 2021-01-14 NOTE — PLAN OF CARE
Problem: Potential for Falls  Goal: Patient will remain free of falls  Description: INTERVENTIONS:  - Assess patient frequently for physical needs  -  Identify cognitive and physical deficits and behaviors that affect risk of falls  -  [place] fall precautions as indicated by assessment   - Educate patient/family on patient safety including physical limitations  - Instruct patient to call for assistance with activity based on assessment  - Modify environment to reduce risk of injury  - Consider OT/PT consult to assist with strengthening/mobility  1/14/2021 0731 by Thai Bhandari RN  Outcome: Progressing  1/14/2021 0730 by Thai Bhandari RN  Outcome: Progressing     Problem: Prexisting or High Potential for Compromised Skin Integrity  Goal: Skin integrity is maintained or improved  Description: INTERVENTIONS:  - Identify patients at risk for skin breakdown  - Assess and monitor skin integrity  - Assess and monitor nutrition and hydration status  - Monitor labs   - Assess for incontinence   - Turn and reposition patient  - Assist with mobility/ambulation  - Relieve pressure over bony prominences  - Avoid friction and shearing  - Provide appropriate hygiene as needed including keeping skin clean and dry  - Evaluate need for skin moisturizer/barrier cream  - Collaborate with interdisciplinary team   - Patient/family teaching  - Consider wound care consult   1/14/2021 0731 by Thai Bhandari RN  Outcome: Progressing  1/14/2021 0730 by Thai Bhandari RN  Outcome: Progressing     Problem: Nutrition/Hydration-ADULT  Goal: Nutrient/Hydration intake appropriate for improving, restoring or maintaining nutritional needs  Description: Monitor and assess patient's nutrition/hydration status for malnutrition  Collaborate with interdisciplinary team and initiate plan and interventions as ordered  Monitor patient's weight and dietary intake as ordered or per policy  Utilize nutrition screening tool and intervene as necessary  Determine patient's food preferences and provide high-protein, high-caloric foods as appropriate       INTERVENTIONS:  - Monitor oral intake, urinary output, labs, and treatment plans  - Assess nutrition and hydration status and recommend course of action  - Evaluate amount of meals eaten  - Assist patient with eating if necessary   - Allow adequate time for meals  - Recommend/ encourage appropriate diets, oral nutritional supplements, and vitamin/mineral supplements  - Order, calculate, and assess calorie counts as needed  - Recommend, monitor, and adjust tube feedings and TPN/PPN based on assessed needs  - Assess need for intravenous fluids  - Provide specific nutrition/hydration education as appropriate  - Include patient/family/caregiver in decisions related to nutrition  1/14/2021 0731 by Zack Brown RN  Outcome: Progressing  1/14/2021 0730 by Zack Brown RN  Outcome: Progressing     Problem: PAIN - ADULT  Goal: Verbalizes/displays adequate comfort level or baseline comfort level  Description: Interventions:  - Encourage patient to monitor pain and request assistance  - Assess pain using appropriate pain scale  - Administer analgesics based on type and severity of pain and evaluate response  - Implement non-pharmacological measures as appropriate and evaluate response  - Consider cultural and social influences on pain and pain management  - Notify physician/advanced practitioner if interventions unsuccessful or patient reports new pain  1/14/2021 0731 by Zack Brown RN  Outcome: Progressing  1/14/2021 0730 by Zack Brown RN  Outcome: Progressing     Problem: GASTROINTESTINAL - ADULT  Goal: Minimal or absence of nausea and/or vomiting  Description: INTERVENTIONS:  - Administer IV fluids if ordered to ensure adequate hydration  - Maintain NPO status until nausea and vomiting are resolved  - Nasogastric tube if ordered  - Administer ordered antiemetic medications as needed  - Provide nonpharmacologic comfort measures as appropriate  - Advance diet as tolerated, if ordered  - Consider nutrition services referral to assist patient with adequate nutrition and appropriate food choices  1/14/2021 0731 by Jenny Zhou RN  Outcome: Progressing  1/14/2021 0730 by Jenny Zhou RN  Outcome: Progressing  Goal: Maintains adequate nutritional intake  Description: INTERVENTIONS:  - Monitor percentage of each meal consumed  - Identify factors contributing to decreased intake, treat as appropriate  - Assist with meals as needed  - Monitor I&O, weight, and lab values if indicated  - Obtain nutrition services referral as needed  1/14/2021 0731 by Jenny Zhou RN  Outcome: Progressing  1/14/2021 0730 by Jenny Zhou RN  Outcome: Progressing     Problem: GASTROINTESTINAL - ADULT  Goal: Maintains adequate nutritional intake  Description: INTERVENTIONS:  - Monitor percentage of each meal consumed  - Identify factors contributing to decreased intake, treat as appropriate  - Assist with meals as needed  - Monitor I&O, weight, and lab values if indicated  - Obtain nutrition services referral as needed  1/14/2021 0731 by Jenny Zhou RN  Outcome: Progressing  1/14/2021 0730 by Jenny Zhou RN  Outcome: Progressing

## 2021-01-17 ENCOUNTER — APPOINTMENT (EMERGENCY)
Dept: RADIOLOGY | Facility: HOSPITAL | Age: 76
DRG: 327 | End: 2021-01-17
Payer: COMMERCIAL

## 2021-01-17 ENCOUNTER — HOSPITAL ENCOUNTER (INPATIENT)
Facility: HOSPITAL | Age: 76
LOS: 4 days | Discharge: HOME | DRG: 327 | End: 2021-01-21
Attending: EMERGENCY MEDICINE | Admitting: THORACIC SURGERY (CARDIOTHORACIC VASCULAR SURGERY)
Payer: COMMERCIAL

## 2021-01-17 DIAGNOSIS — R10.9 ABDOMINAL PAIN: ICD-10-CM

## 2021-01-17 DIAGNOSIS — R11.2 NAUSEA & VOMITING: ICD-10-CM

## 2021-01-17 DIAGNOSIS — K44.9 HIATAL HERNIA: Primary | ICD-10-CM

## 2021-01-17 LAB
ALBUMIN SERPL BCP-MCNC: 3.1 G/DL (ref 3.5–5)
ALP SERPL-CCNC: 87 U/L (ref 46–116)
ALT SERPL W P-5'-P-CCNC: 52 U/L (ref 12–78)
ANION GAP SERPL CALCULATED.3IONS-SCNC: 6 MMOL/L (ref 4–13)
AST SERPL W P-5'-P-CCNC: 24 U/L (ref 5–45)
BASOPHILS # BLD AUTO: 0.02 THOUSANDS/ΜL (ref 0–0.1)
BASOPHILS NFR BLD AUTO: 0 % (ref 0–1)
BILIRUB SERPL-MCNC: 0.39 MG/DL (ref 0.2–1)
BUN SERPL-MCNC: 24 MG/DL (ref 5–25)
CALCIUM ALBUM COR SERPL-MCNC: 9.9 MG/DL (ref 8.3–10.1)
CALCIUM SERPL-MCNC: 9.2 MG/DL (ref 8.3–10.1)
CHLORIDE SERPL-SCNC: 109 MMOL/L (ref 100–108)
CO2 SERPL-SCNC: 31 MMOL/L (ref 21–32)
CREAT SERPL-MCNC: 1.88 MG/DL (ref 0.6–1.3)
EOSINOPHIL # BLD AUTO: 0.06 THOUSAND/ΜL (ref 0–0.61)
EOSINOPHIL NFR BLD AUTO: 1 % (ref 0–6)
ERYTHROCYTE [DISTWIDTH] IN BLOOD BY AUTOMATED COUNT: 14.6 % (ref 11.6–15.1)
GFR SERPL CREATININE-BSD FRML MDRD: 34 ML/MIN/1.73SQ M
GLUCOSE SERPL-MCNC: 116 MG/DL (ref 65–140)
HCT VFR BLD AUTO: 40.7 % (ref 36.5–49.3)
HGB BLD-MCNC: 13.1 G/DL (ref 12–17)
IMM GRANULOCYTES # BLD AUTO: 0.05 THOUSAND/UL (ref 0–0.2)
IMM GRANULOCYTES NFR BLD AUTO: 0 % (ref 0–2)
LACTATE SERPL-SCNC: 1.3 MMOL/L (ref 0.5–2)
LIPASE SERPL-CCNC: 496 U/L (ref 73–393)
LYMPHOCYTES # BLD AUTO: 1.51 THOUSANDS/ΜL (ref 0.6–4.47)
LYMPHOCYTES NFR BLD AUTO: 12 % (ref 14–44)
MCH RBC QN AUTO: 29.4 PG (ref 26.8–34.3)
MCHC RBC AUTO-ENTMCNC: 32.2 G/DL (ref 31.4–37.4)
MCV RBC AUTO: 91 FL (ref 82–98)
MONOCYTES # BLD AUTO: 1.29 THOUSAND/ΜL (ref 0.17–1.22)
MONOCYTES NFR BLD AUTO: 10 % (ref 4–12)
NEUTROPHILS # BLD AUTO: 10.1 THOUSANDS/ΜL (ref 1.85–7.62)
NEUTS SEG NFR BLD AUTO: 77 % (ref 43–75)
NRBC BLD AUTO-RTO: 0 /100 WBCS
PLATELET # BLD AUTO: 289 THOUSANDS/UL (ref 149–390)
PLATELET # BLD AUTO: 304 THOUSANDS/UL (ref 149–390)
PMV BLD AUTO: 11.2 FL (ref 8.9–12.7)
PMV BLD AUTO: 11.7 FL (ref 8.9–12.7)
POTASSIUM SERPL-SCNC: 3.9 MMOL/L (ref 3.5–5.3)
PROT SERPL-MCNC: 7.8 G/DL (ref 6.4–8.2)
RBC # BLD AUTO: 4.46 MILLION/UL (ref 3.88–5.62)
SODIUM SERPL-SCNC: 146 MMOL/L (ref 136–145)
WBC # BLD AUTO: 13.03 THOUSAND/UL (ref 4.31–10.16)

## 2021-01-17 PROCEDURE — 96374 THER/PROPH/DIAG INJ IV PUSH: CPT

## 2021-01-17 PROCEDURE — 85049 AUTOMATED PLATELET COUNT: CPT | Performed by: SURGERY

## 2021-01-17 PROCEDURE — 36415 COLL VENOUS BLD VENIPUNCTURE: CPT | Performed by: STUDENT IN AN ORGANIZED HEALTH CARE EDUCATION/TRAINING PROGRAM

## 2021-01-17 PROCEDURE — 83690 ASSAY OF LIPASE: CPT | Performed by: STUDENT IN AN ORGANIZED HEALTH CARE EDUCATION/TRAINING PROGRAM

## 2021-01-17 PROCEDURE — 74177 CT ABD & PELVIS W/CONTRAST: CPT

## 2021-01-17 PROCEDURE — 80053 COMPREHEN METABOLIC PANEL: CPT | Performed by: STUDENT IN AN ORGANIZED HEALTH CARE EDUCATION/TRAINING PROGRAM

## 2021-01-17 PROCEDURE — 71260 CT THORAX DX C+: CPT

## 2021-01-17 PROCEDURE — 99285 EMERGENCY DEPT VISIT HI MDM: CPT | Performed by: EMERGENCY MEDICINE

## 2021-01-17 PROCEDURE — 83605 ASSAY OF LACTIC ACID: CPT | Performed by: STUDENT IN AN ORGANIZED HEALTH CARE EDUCATION/TRAINING PROGRAM

## 2021-01-17 PROCEDURE — 96361 HYDRATE IV INFUSION ADD-ON: CPT

## 2021-01-17 PROCEDURE — 99285 EMERGENCY DEPT VISIT HI MDM: CPT

## 2021-01-17 PROCEDURE — C9113 INJ PANTOPRAZOLE SODIUM, VIA: HCPCS | Performed by: SURGERY

## 2021-01-17 PROCEDURE — NC001 PR NO CHARGE: Performed by: THORACIC SURGERY (CARDIOTHORACIC VASCULAR SURGERY)

## 2021-01-17 PROCEDURE — 85025 COMPLETE CBC W/AUTO DIFF WBC: CPT | Performed by: STUDENT IN AN ORGANIZED HEALTH CARE EDUCATION/TRAINING PROGRAM

## 2021-01-17 PROCEDURE — G0008 ADMIN INFLUENZA VIRUS VAC: HCPCS | Performed by: THORACIC SURGERY (CARDIOTHORACIC VASCULAR SURGERY)

## 2021-01-17 PROCEDURE — G1004 CDSM NDSC: HCPCS

## 2021-01-17 PROCEDURE — 90662 IIV NO PRSV INCREASED AG IM: CPT | Performed by: THORACIC SURGERY (CARDIOTHORACIC VASCULAR SURGERY)

## 2021-01-17 RX ORDER — ONDANSETRON 2 MG/ML
4 INJECTION INTRAMUSCULAR; INTRAVENOUS EVERY 6 HOURS PRN
Status: DISCONTINUED | OUTPATIENT
Start: 2021-01-17 | End: 2021-01-21 | Stop reason: HOSPADM

## 2021-01-17 RX ORDER — PANTOPRAZOLE SODIUM 40 MG/1
40 INJECTION, POWDER, FOR SOLUTION INTRAVENOUS EVERY 12 HOURS SCHEDULED
Status: DISCONTINUED | OUTPATIENT
Start: 2021-01-17 | End: 2021-01-21 | Stop reason: HOSPADM

## 2021-01-17 RX ORDER — LORAZEPAM 2 MG/ML
0.5 INJECTION INTRAMUSCULAR ONCE
Status: COMPLETED | OUTPATIENT
Start: 2021-01-17 | End: 2021-01-17

## 2021-01-17 RX ORDER — LIDOCAINE HYDROCHLORIDE 20 MG/ML
1 JELLY TOPICAL ONCE
Status: COMPLETED | OUTPATIENT
Start: 2021-01-17 | End: 2021-01-17

## 2021-01-17 RX ORDER — LIDOCAINE HYDROCHLORIDE 20 MG/ML
JELLY TOPICAL
Status: COMPLETED
Start: 2021-01-17 | End: 2021-01-17

## 2021-01-17 RX ORDER — HYDROMORPHONE HCL/PF 1 MG/ML
0.5 SYRINGE (ML) INJECTION
Status: DISCONTINUED | OUTPATIENT
Start: 2021-01-17 | End: 2021-01-18

## 2021-01-17 RX ORDER — SODIUM CHLORIDE, SODIUM LACTATE, POTASSIUM CHLORIDE, CALCIUM CHLORIDE 600; 310; 30; 20 MG/100ML; MG/100ML; MG/100ML; MG/100ML
125 INJECTION, SOLUTION INTRAVENOUS CONTINUOUS
Status: DISCONTINUED | OUTPATIENT
Start: 2021-01-17 | End: 2021-01-19

## 2021-01-17 RX ORDER — HYDROMORPHONE HCL/PF 1 MG/ML
0.5 SYRINGE (ML) INJECTION EVERY 4 HOURS PRN
Status: DISCONTINUED | OUTPATIENT
Start: 2021-01-17 | End: 2021-01-18

## 2021-01-17 RX ORDER — HEPARIN SODIUM 5000 [USP'U]/ML
5000 INJECTION, SOLUTION INTRAVENOUS; SUBCUTANEOUS EVERY 8 HOURS SCHEDULED
Status: DISCONTINUED | OUTPATIENT
Start: 2021-01-17 | End: 2021-01-21 | Stop reason: HOSPADM

## 2021-01-17 RX ORDER — LABETALOL 20 MG/4 ML (5 MG/ML) INTRAVENOUS SYRINGE
10 EVERY 6 HOURS PRN
Status: DISCONTINUED | OUTPATIENT
Start: 2021-01-17 | End: 2021-01-21 | Stop reason: HOSPADM

## 2021-01-17 RX ORDER — ONDANSETRON 2 MG/ML
4 INJECTION INTRAMUSCULAR; INTRAVENOUS ONCE
Status: COMPLETED | OUTPATIENT
Start: 2021-01-17 | End: 2021-01-17

## 2021-01-17 RX ADMIN — SODIUM CHLORIDE 1000 ML: 0.9 INJECTION, SOLUTION INTRAVENOUS at 10:44

## 2021-01-17 RX ADMIN — HEPARIN SODIUM 5000 UNITS: 5000 INJECTION INTRAVENOUS; SUBCUTANEOUS at 21:23

## 2021-01-17 RX ADMIN — HEPARIN SODIUM 5000 UNITS: 5000 INJECTION INTRAVENOUS; SUBCUTANEOUS at 14:26

## 2021-01-17 RX ADMIN — SODIUM CHLORIDE, SODIUM LACTATE, POTASSIUM CHLORIDE, AND CALCIUM CHLORIDE 125 ML/HR: .6; .31; .03; .02 INJECTION, SOLUTION INTRAVENOUS at 14:24

## 2021-01-17 RX ADMIN — SODIUM CHLORIDE, SODIUM LACTATE, POTASSIUM CHLORIDE, AND CALCIUM CHLORIDE 125 ML/HR: .6; .31; .03; .02 INJECTION, SOLUTION INTRAVENOUS at 22:46

## 2021-01-17 RX ADMIN — ONDANSETRON 4 MG: 2 INJECTION INTRAMUSCULAR; INTRAVENOUS at 10:48

## 2021-01-17 RX ADMIN — INFLUENZA A VIRUS A/MICHIGAN/45/2015 X-275 (H1N1) ANTIGEN (FORMALDEHYDE INACTIVATED), INFLUENZA A VIRUS A/SINGAPORE/INFIMH-16-0019/2016 IVR-186 (H3N2) ANTIGEN (FORMALDEHYDE INACTIVATED), INFLUENZA B VIRUS B/PHUKET/3073/2013 ANTIGEN (FORMALDEHYDE INACTIVATED), AND INFLUENZA B VIRUS B/MARYLAND/15/2016 BX-69A ANTIGEN (FORMALDEHYDE INACTIVATED) 0.7 ML: 60; 60; 60; 60 INJECTION, SUSPENSION INTRAMUSCULAR at 16:44

## 2021-01-17 RX ADMIN — LIDOCAINE HYDROCHLORIDE: 20 JELLY TOPICAL at 16:26

## 2021-01-17 RX ADMIN — LORAZEPAM 0.5 MG: 2 INJECTION INTRAMUSCULAR; INTRAVENOUS at 15:04

## 2021-01-17 RX ADMIN — LIDOCAINE HYDROCHLORIDE 1 APPLICATION: 20 JELLY TOPICAL at 15:30

## 2021-01-17 RX ADMIN — PANTOPRAZOLE SODIUM 40 MG: 40 INJECTION, POWDER, FOR SOLUTION INTRAVENOUS at 21:23

## 2021-01-17 RX ADMIN — IOHEXOL 100 ML: 350 INJECTION, SOLUTION INTRAVENOUS at 11:26

## 2021-01-17 NOTE — ED NOTES
Patient transported to [facility]  Maria Ines Whitmore RN  01/17/21 1110

## 2021-01-17 NOTE — ED PROVIDER NOTES
History  Chief Complaint   Patient presents with   Giovanni Del Rio told pt to come to ED today as he is still have many issues with his hernia, pt recently d/c from [facility] on 1/14 after many tests and evaluation  pt arrives today with hiccups, discomfort in abd some nausea and vomiting, no blood     Patient is a 47-year-old male past medical history of hypertension, GERD, and recently diagnosed hiatal hernia who presents to the ED for 1 day of worsening nausea, vomiting, abdominal discomfort, and hicccupps  Patient states he initially presented with the symptoms to [facility] on 01/10/2021  Initially, there was concern for food poisoning  However, after further evaluation, it was discovered the patient had a hiatal hernia containing the distal stomach, pyloric sphincter and proximal duodenum  Patient was subsequently admitted  An EGD and barium swallow were then performed, which reconfirmed large complex hiatal hernia  As patient was able to advance tolerate p o  And there was improvement symptoms, patient was discharged on 1/14/2021 and  instructed to follow up with thoracic surgery as an outpatient for further management  Patient states he was doing fine up until yesterday, when the nausea, vomiting, hiccups, and abdominal discomfort returned  He states that the symptoms have worsened since yesterday  He has only vomited once or twice, and is more dry heaving  He has not had a bowel movement since last Thursday  He has been unable to tolerate any p o  Secondary to nausea  He The abdominal discomfort is vague, and described as scheduled  He denies any fevers, chest pain, shortness of breath, or any other new or worsening symptoms  History provided by:  Patient   used: No    Nausea  The primary symptoms include abdominal pain, nausea and vomiting  Primary symptoms do not include fever, diarrhea, dysuria or rash  The illness began yesterday   The onset was gradual  The problem has been gradually worsening  The abdominal pain began yesterday  The abdominal pain does not radiate  The abdominal pain is relieved by nothing  Nausea began yesterday  The illness does not include chills or back pain  Associated medical issues comments: Hiatal hernia  Prior to Admission Medications   Prescriptions Last Dose Informant Patient Reported? Taking? amLODIPine (NORVASC) 5 mg tablet 1/17/2021 at Unknown time  Yes Yes   Sig: Take 5 mg by mouth daily   lisinopril (ZESTRIL) 10 mg tablet 1/17/2021 at Unknown time  Yes Yes   Sig: Take 10 mg by mouth daily   omeprazole (PriLOSEC) 20 mg delayed release capsule Not Taking at Unknown time  Yes No   Sig: Take 20 mg by mouth daily       Facility-Administered Medications: None       Past Medical History:   Diagnosis Date    GERD (gastroesophageal reflux disease)     Hernia, internal 01/10/2021    Hypertension        Past Surgical History:   Procedure Laterality Date    ESOPHAGOGASTRODUODENOSCOPY N/A 10/18/2018    Procedure: ESOPHAGOGASTRODUODENOSCOPY (EGD); Surgeon: Tawanda Zelaya MD;  Location: [facility]; Service: Gastroenterology    NO PAST SURGERIES      AK COLONOSCOPY FLX DX W/COLLJ SPEC WHEN PFRMD N/A 11/21/2017    Procedure: EGD AND COLONOSCOPY;  Surgeon: Joss Murillo MD;  Location: [facility]; Service: Gastroenterology    AK EDG US EXAM SURGICAL ALTER STOM DUODENUM/JEJUNUM N/A 10/18/2018    Procedure: LINEAR ENDOSCOPIC U/S;  Surgeon: Tawanda Zleaya MD;  Location: [facility]; Service: Gastroenterology       History reviewed  No pertinent family history  I have reviewed and agree with the history as documented  E-Cigarette/Vaping    E-Cigarette Use Never User      E-Cigarette/Vaping Substances     Social History     Tobacco Use    Smoking status: Never Smoker    Smokeless tobacco: Never Used   Substance Use Topics    Alcohol use:  Yes     Alcohol/week: 1 0 standard drinks     Types: 1 Glasses of wine per week Frequency: 4 or more times a week     Drinks per session: 1 or 2     Binge frequency: Daily or almost daily     Comment: glass of wine/beer a day    Drug use: No        Review of Systems   Constitutional: Negative for chills and fever  HENT: Negative for sore throat and trouble swallowing  Eyes: Negative for redness and itching  Respiratory: Negative for cough and shortness of breath  Cardiovascular: Negative for chest pain and leg swelling  Gastrointestinal: Positive for abdominal pain, nausea and vomiting  Negative for diarrhea  Hiccups   Genitourinary: Negative for difficulty urinating, dysuria and hematuria  Musculoskeletal: Negative for back pain, neck pain and neck stiffness  Skin: Negative for rash and wound  Neurological: Negative for dizziness and headaches  All other systems reviewed and are negative  Physical Exam  ED Triage Vitals   Temperature Pulse Respirations Blood Pressure SpO2   01/17/21 1012 01/17/21 1012 01/17/21 1012 01/17/21 1012 01/17/21 1012   98 2 °F (36 8 °C) 98 20 159/95 97 %      Temp Source Heart Rate Source Patient Position - Orthostatic VS BP Location FiO2 (%)   01/17/21 1012 01/17/21 1400 -- 01/17/21 1100 --   Oral Monitor  Left arm       Pain Score       01/17/21 1012       5             Orthostatic Vital Signs  Vitals:    01/17/21 1100 01/17/21 1200 01/17/21 1230 01/17/21 1400   BP: 155/87 160/92 (!) 183/98 118/81   Pulse: 96 94 (!) 128 104       Physical Exam  Vitals signs and nursing note reviewed  Constitutional:       General: He is not in acute distress  Appearance: He is well-developed  He is not diaphoretic  Comments: Hiccupping  Appears uncomfortable   HENT:      Head: Normocephalic and atraumatic  Right Ear: External ear normal       Left Ear: External ear normal       Nose: Nose normal    Eyes:      General: Lids are normal  No scleral icterus  Neck:      Musculoskeletal: Normal range of motion and neck supple  Cardiovascular:      Rate and Rhythm: Normal rate and regular rhythm  Heart sounds: Normal heart sounds  No murmur  No friction rub  No gallop  Pulmonary:      Effort: Pulmonary effort is normal  No respiratory distress  Breath sounds: Normal breath sounds  No wheezing or rales  Abdominal:      General: Bowel sounds are normal       Palpations: Abdomen is soft  Tenderness: There is abdominal tenderness (Mild) in the epigastric area  There is no guarding or rebound  Musculoskeletal: Normal range of motion  General: No deformity  Skin:     General: Skin is warm and dry  Capillary Refill: Capillary refill takes less than 2 seconds  Neurological:      General: No focal deficit present  Mental Status: He is alert  Psychiatric:         Behavior: Behavior normal          ED Medications  Medications   iohexol (OMNIPAQUE) 240 MG/ML solution 50 mL (50 mL Oral Not Given 1/17/21 1111)   lactated ringers infusion (125 mL/hr Intravenous New Bag 1/17/21 1424)   ondansetron (ZOFRAN) injection 4 mg (has no administration in time range)   heparin (porcine) subcutaneous injection 5,000 Units (5,000 Units Subcutaneous Given 1/17/21 1426)   LORazepam (ATIVAN) injection 0 5 mg (has no administration in time range)   sodium chloride 0 9 % bolus 1,000 mL (0 mL Intravenous Stopped 1/17/21 1424)   ondansetron (ZOFRAN) injection 4 mg (4 mg Intravenous Given 1/17/21 1048)   iohexol (OMNIPAQUE) 350 MG/ML injection (MULTI-DOSE) 100 mL (100 mL Intravenous Given 1/17/21 1126)       Diagnostic Studies  Results Reviewed     Procedure Component Value Units Date/Time    Platelet count [[id number]]     Lab Status: No result Specimen: Blood     Lactic acid, plasma [[id number]]  (Normal) Collected: 01/17/21 1045    Lab Status: Final result Specimen: Blood from Arm, Right Updated: 01/17/21 1119     LACTIC ACID 1 3 mmol/L     Narrative:      Result may be elevated if tourniquet was used during collection  Lipase [[id number]]  (Abnormal) Collected: 01/17/21 1045    Lab Status: Final result Specimen: Blood from Arm, Right Updated: 01/17/21 1115     Lipase 496 u/L     Comprehensive metabolic panel [[id number]]  (Abnormal) Collected: 01/17/21 1045    Lab Status: Final result Specimen: Blood from Arm, Right Updated: 01/17/21 1115     Sodium 146 mmol/L      Potassium 3 9 mmol/L      Chloride 109 mmol/L      CO2 31 mmol/L      ANION GAP 6 mmol/L      BUN 24 mg/dL      Creatinine 1 88 mg/dL      Glucose 116 mg/dL      Calcium 9 2 mg/dL      Corrected Calcium 9 9 mg/dL      AST 24 U/L      ALT 52 U/L      Alkaline Phosphatase 87 U/L      Total Protein 7 8 g/dL      Albumin 3 1 g/dL      Total Bilirubin 0 39 mg/dL      eGFR 34 ml/min/1 73sq m     Narrative:      [place] guidelines for Chronic Kidney Disease (CKD):     Stage 1 with normal or high GFR (GFR > 90 mL/min/1 73 square meters)    Stage 2 Mild CKD (GFR = 60-89 mL/min/1 73 square meters)    Stage 3A Moderate CKD (GFR = 45-59 mL/min/1 73 square meters)    Stage 3B Moderate CKD (GFR = 30-44 mL/min/1 73 square meters)    Stage 4 Severe CKD (GFR = 15-29 mL/min/1 73 square meters)    Stage 5 End Stage CKD (GFR <15 mL/min/1 73 square meters)  Note: GFR calculation is accurate only with a steady state creatinine    CBC and differential [[id number]]  (Abnormal) Collected: 01/17/21 1045    Lab Status: Final result Specimen: Blood from Arm, Right Updated: 01/17/21 1055     WBC 13 03 Thousand/uL      RBC 4 46 Million/uL      Hemoglobin 13 1 g/dL      Hematocrit 40 7 %      MCV 91 fL      MCH 29 4 pg      MCHC 32 2 g/dL      RDW 14 6 %      MPV 11 7 fL      Platelets 063 Thousands/uL      nRBC 0 /100 WBCs      Neutrophils Relative 77 %      Immat GRANS % 0 %      Lymphocytes Relative 12 %      Monocytes Relative 10 %      Eosinophils Relative 1 %      Basophils Relative 0 %      Neutrophils Absolute 10 10 Thousands/µL      Immature Grans Absolute 0 05 Thousand/uL      Lymphocytes Absolute 1 51 Thousands/µL      Monocytes Absolute 1 29 Thousand/µL      Eosinophils Absolute 0 06 Thousand/µL      Basophils Absolute 0 02 Thousands/µL                  CT chest abdomen pelvis w contrast   Final Result by Coleman Mello MD (01/17 1247)      No significant interval change since prior examinations  Redemonstration of marked distention of the stomach with fluid and a large complex paraesophageal type hiatal hernia  Presence of a gastric volvulus is not entirely excluded however does not appear to be clearly present on recent upper GI examination  Surgical team has been consulted as the patient presents with persistent nausea vomiting  I personally discussed this study with Pedro Pablo Torres on 1/17/2021 at 12:46 PM                Workstation performed: [id number]               Procedures  Procedures      ED Course  ED Course as of Jan 17 1430   Sun Jan 17, 2021   1059 WBC(!): 13 03   1127 LACTIC ACID: 1 3   1249 CT chest abdomen pelvis w contrast   1249 Unable to place an NG tube      1304 Pending surgery resident eval                  Identification of Seniors at Risk      Most Recent Value   (ISAR) Identification of Seniors at Risk   Before the illness or injury that brought you to the Emergency, did you need someone to help you on a regular basis? 0 Filed at: 01/17/2021 1015   In the last 24 hours, have you needed more help than usual?  0 Filed at: 01/17/2021 1015   Have you been hospitalized for one or more nights during the past 6 months? 1 Filed at: 01/17/2021 1015   In general, do you see well?  0 Filed at: 01/17/2021 1015   In general, do you have serious problems with your memory? 0 Filed at: 01/17/2021 1015   Do you take more than three different medications every day?   1 Filed at: 01/17/2021 1015   ISAR Score  2 Filed at: 01/17/2021 1015                              MDM  Number of Diagnoses or Management Options  Abdominal pain: established and worsening  Hiatal hernia: established and worsening  Nausea & vomiting: established and worsening  Diagnosis management comments: Patient is 66-year-old male who presented to the emergency department for worsening abdominal discomfort, nausea, and vomiting, identical to his prior presentation when he was diagnosed with a large hiatal hernia  In the emergency department, patient was borderline tachycardic and slightly hypertensive  On exam, patient was actively hiccuping  He had mild tenderness to palpation over the epigastrium  Clinical impression is obstruction secondary to large hiatal hernia  Abdominal workup ordered to rule out any other possible causes of abdominal pain, nausea, and vomiting  CT chest abdomen and pelvis without contrast also ordered  Imaging revealed markedly distention of the stomach with fluid a large complex paraesophageal type hernia  Patient also had a slight white count of 13 03  Discuss case with surgery  We will admit  Patient verbalized understanding of plan of care         Amount and/or Complexity of Data Reviewed  Clinical lab tests: ordered and reviewed  Tests in the radiology section of CPT®: ordered and reviewed  Tests in the medicine section of CPT®: ordered and reviewed  Decide to obtain previous medical records or to obtain history from someone other than the patient: yes  Review and summarize past medical records: yes  Independent visualization of images, tracings, or specimens: yes    Risk of Complications, Morbidity, and/or Mortality  Presenting problems: high  Diagnostic procedures: high  Management options: high    Patient Progress  Patient progress: stable      Disposition  Final diagnoses:   Abdominal pain   Nausea & vomiting   Hiatal hernia     Time reflects when diagnosis was documented in both MDM as applicable and the Disposition within this note     Time User Action Codes Description Comment    1/17/2021 12:35 PM Devin Florez Add [R10 9] Abdominal pain     1/17/2021 12:35 PM Bettie Bright Add [R11 2] Nausea & vomiting     1/17/2021 12:35 PM Bettie Bright Add [K44 9] Hiatal hernia       ED Disposition     ED Disposition Condition Date/Time Comment    Admit Stable Sun Jan 17, 2021 12:37 PM         Follow-up Information    None         Patient's Medications   Discharge Prescriptions    No medications on file     No discharge procedures on file  PDMP Review       Value Time User    PDMP Reviewed  Yes 1/13/2021  1:24 PM Omkar Adair PA-C           ED Provider  Attending physically available and evaluated Greg Szymanski  JAY managed the patient along with the ED Attending      Electronically Signed by         Jazmin Manley DO  01/17/21 7015

## 2021-01-17 NOTE — ED NOTES
5 attempts for NG tube with 12F and 18F were unsuccessful  Dr Cherry Hamilton aware       Ananda Chao, RN  01/17/21 3932

## 2021-01-17 NOTE — PLAN OF CARE
Problem: PAIN - ADULT  Goal: Verbalizes/displays adequate comfort level or baseline comfort level  Description: Interventions:  - Encourage patient to monitor pain and request assistance  - Assess pain using appropriate pain scale  - Administer analgesics based on type and severity of pain and evaluate response  - Implement non-pharmacological measures as appropriate and evaluate response  - Consider cultural and social influences on pain and pain management  - Notify physician/advanced practitioner if interventions unsuccessful or patient reports new pain  Outcome: Progressing     Problem: INFECTION - ADULT  Goal: Absence or prevention of progression during hospitalization  Description: INTERVENTIONS:  - Assess and monitor for signs and symptoms of infection  - Monitor lab/diagnostic results  - Monitor all insertion sites, i e  indwelling lines, tubes, and drains  - Monitor endotracheal if appropriate and nasal secretions for changes in amount and color  - [place] appropriate cooling/warming therapies per order  - Administer medications as ordered  - Instruct and encourage patient and family to use good hand hygiene technique  - Identify and instruct in appropriate isolation precautions for identified infection/condition  Outcome: Progressing  Goal: Absence of fever/infection during neutropenic period  Description: INTERVENTIONS:  - Monitor WBC    Outcome: Progressing     Problem: SAFETY ADULT  Goal: Patient will remain free of falls  Description: INTERVENTIONS:  - Assess patient frequently for physical needs  -  Identify cognitive and physical deficits and behaviors that affect risk of falls    -  [place] fall precautions as indicated by assessment   - Educate patient/family on patient safety including physical limitations  - Instruct patient to call for assistance with activity based on assessment  - Modify environment to reduce risk of injury  - Consider OT/PT consult to assist with strengthening/mobility  Outcome: Progressing  Goal: Maintain or return to baseline ADL function  Description: INTERVENTIONS:  -  Assess patient's ability to carry out ADLs; assess patient's baseline for ADL function and identify physical deficits which impact ability to perform ADLs (bathing, care of mouth/teeth, toileting, grooming, dressing, etc )  - Assess/evaluate cause of self-care deficits   - Assess range of motion  - Assess patient's mobility; develop plan if impaired  - Assess patient's need for assistive devices and provide as appropriate  - Encourage maximum independence but intervene and supervise when necessary  - Involve family in performance of ADLs  - Assess for home care needs following discharge   - Consider OT consult to assist with ADL evaluation and planning for discharge  - Provide patient education as appropriate  Outcome: Progressing  Goal: Maintain or return mobility status to optimal level  Description: INTERVENTIONS:  - Assess patient's baseline mobility status (ambulation, transfers, stairs, etc )    - Identify cognitive and physical deficits and behaviors that affect mobility  - Identify mobility aids required to assist with transfers and/or ambulation (gait belt, sit-to-stand, lift, walker, cane, etc )  - [place] fall precautions as indicated by assessment  - Record patient progress and toleration of activity level on Mobility SBAR; progress patient to next Phase/Stage  - Instruct patient to call for assistance with activity based on assessment  - Consider rehabilitation consult to assist with strengthening/weightbearing, etc   Outcome: Progressing     Problem: DISCHARGE PLANNING  Goal: Discharge to home or other facility with appropriate resources  Description: INTERVENTIONS:  - Identify barriers to discharge w/patient and caregiver  - Arrange for needed discharge resources and transportation as appropriate  - Identify discharge learning needs (meds, wound care, etc )  - Arrange for interpretive services to assist at discharge as needed  - Refer to Case Management Department for coordinating discharge planning if the patient needs post-hospital services based on physician/advanced practitioner order or complex needs related to functional status, cognitive ability, or social support system  Outcome: Progressing     Problem: Knowledge Deficit  Goal: Patient/family/caregiver demonstrates understanding of disease process, treatment plan, medications, and discharge instructions  Description: Complete learning assessment and assess knowledge base    Interventions:  - Provide teaching at level of understanding  - Provide teaching via preferred learning methods  Outcome: Progressing

## 2021-01-17 NOTE — H&P
H&P- Thoracic Surgery  Thony Domínguez 66 y o  male MRN: [id number]  Unit/Bed#: [facility] 824-01 Encounter: [id number]        Assessment/Plan     Assessment:  76M w/ Hiatal Hernia complicated by gastric outlet obstruction    Plan:  Admit to thoracic surgery  Fluid resuscitate  NGT/NPO  LR @ 125  Protonix bid  DVT PPx    Patient will require paraesophageal hernia repair during this admission  Timing TBD      History of Present Illness     HPI:  Thony Domínguez is a 66 y o  male who presents shortly after being discharged from [facility] on 1/14 after having similar symptoms  The patient was noted to have a hiatal hernia which he then had symptoms resolve and was going to follow up with Thoracic Surgery outpatient  Last two days he began to have less PO tolerance more distention and burping  He decided to come to ED because he recognized similar symptoms  Review of Systems   Constitutional: Positive for appetite change  Negative for chills, fatigue and fever  HENT: Negative  Eyes: Negative  Respiratory: Negative  Cardiovascular: Negative  Gastrointestinal: Positive for abdominal distention, abdominal pain, nausea and vomiting  Endocrine: Negative  Genitourinary: Negative  Musculoskeletal: Negative  Skin: Negative  Allergic/Immunologic: Negative  Neurological: Negative  Hematological: Negative  Psychiatric/Behavioral: Negative  Historical Information   Past Medical History:   Diagnosis Date    GERD (gastroesophageal reflux disease)     Hernia, internal 01/10/2021    Hypertension      Past Surgical History:   Procedure Laterality Date    ESOPHAGOGASTRODUODENOSCOPY N/A 10/18/2018    Procedure: ESOPHAGOGASTRODUODENOSCOPY (EGD); Surgeon: Dhara Schmitt MD;  [facility];   Service: Gastroenterology    NO PAST SURGERIES      SD COLONOSCOPY FLX DX W/COLLJ SPEC WHEN PFRMD N/A 11/21/2017    Procedure: EGD AND COLONOSCOPY;  Surgeon: Diane King MD;  [facility]; Service: Gastroenterology    SD EDG US EXAM SURGICAL ALTER STOM DUODENUM/JEJUNUM N/A 10/18/2018    Procedure: LINEAR ENDOSCOPIC U/S;  Surgeon: Deejay Roy MD;  [facility]: [facility]; Service: Gastroenterology     Social History   Social History     Substance and Sexual Activity   Alcohol Use Yes    Alcohol/week: 1 0 standard drinks    Types: 1 Glasses of wine per week    Frequency: 4 or more times a week    Drinks per session: 1 or 2    Binge frequency: Daily or almost daily    Comment: glass of wine/beer a day     Social History     Substance and Sexual Activity   Drug Use No     Social History     Tobacco Use   Smoking Status Never Smoker   Smokeless Tobacco Never Used     Family History: History reviewed  No pertinent family history  Meds/Allergies   PTA meds:   Prior to Admission Medications   Prescriptions Last Dose Informant Patient Reported? Taking?    amLODIPine (NORVASC) 5 mg tablet 1/17/2021 at Unknown time  Yes Yes   Sig: Take 5 mg by mouth daily   lisinopril (ZESTRIL) 10 mg tablet 1/17/2021 at Unknown time  Yes Yes   Sig: Take 10 mg by mouth daily   omeprazole (PriLOSEC) 20 mg delayed release capsule Not Taking at Unknown time  Yes No   Sig: Take 20 mg by mouth daily       Facility-Administered Medications: None     No Known Allergies    Objective   First Vitals:   Blood Pressure: 159/95 (01/17/21 1012)  Pulse: 98 (01/17/21 1012)  Temperature: 98 2 °F (36 8 °C) (01/17/21 1012)  Temp Source: Oral (01/17/21 1012)  Respirations: 20 (01/17/21 1012)  Height: 5' 11" (180 3 cm) (01/17/21 1012)  Weight - Scale: 81 3 kg (179 lb 3 7 oz) (01/17/21 1012)  SpO2: 97 % (01/17/21 1012)    Current Vitals:   Blood Pressure: 150/98 (01/17/21 1456)  Pulse: (!) 106 (01/17/21 1430)  Temperature: 98 8 °F (37 1 °C) (01/17/21 1456)  Temp Source: Oral (01/17/21 1456)  Respirations: 18 (01/17/21 1456)  Height: 5' 11" (180 3 cm) (01/17/21 1012)  Weight - Scale: 81 3 kg (179 lb 3 7 oz) (01/17/21 1012)  SpO2: 93 % (01/17/21 1430)      Intake/Output Summary (Last 24 hours) at 1/17/2021 1547  Last data filed at 1/17/2021 1424  Gross per 24 hour   Intake 1000 ml   Output --   Net 1000 ml       Invasive Devices     Peripheral Intravenous Line            Peripheral IV 01/17/21 Right Forearm less than 1 day                Physical Exam  Constitutional:       General: He is not in acute distress  Appearance: He is ill-appearing  HENT:      Head: Normocephalic and atraumatic  Mouth/Throat:      Mouth: Mucous membranes are moist    Eyes:      General: No scleral icterus  Neck:      Musculoskeletal: Neck supple  Cardiovascular:      Rate and Rhythm: Tachycardia present  Pulmonary:      Effort: Pulmonary effort is normal    Abdominal:      General: There is distension  Palpations: Abdomen is soft  Tenderness: There is abdominal tenderness  There is no guarding  Hernia: No hernia is present  Genitourinary:     Comments: deferred  Musculoskeletal: Normal range of motion  Skin:     General: Skin is warm  Neurological:      General: No focal deficit present  Mental Status: He is alert and oriented to person, place, and time  Psychiatric:         Mood and Affect: Mood normal          Lab Results:   I have personally reviewed pertinent lab results  , CBC:   Lab Results   Component Value Date    WBC 13 03 (H) 01/17/2021    HGB 13 1 01/17/2021    HCT 40 7 01/17/2021    MCV 91 01/17/2021     01/17/2021    MCH 29 4 01/17/2021    MCHC 32 2 01/17/2021    RDW 14 6 01/17/2021    MPV 11 7 01/17/2021    NRBC 0 01/17/2021   , CMP:   Lab Results   Component Value Date    SODIUM 146 (H) 01/17/2021    K 3 9 01/17/2021     (H) 01/17/2021    CO2 31 01/17/2021    BUN 24 01/17/2021    CREATININE 1 88 (H) 01/17/2021    CALCIUM 9 2 01/17/2021    AST 24 01/17/2021    ALT 52 01/17/2021    ALKPHOS 87 01/17/2021    EGFR 34 01/17/2021     Imaging: I have personally reviewed pertinent reports     and I have personally reviewed pertinent films in PACS  EKG, Pathology, and Other Studies: I have personally reviewed pertinent reports     and I have personally reviewed pertinent films in PACS    Code Status: Level 1 - Full Code  Advance Directive and Living Will:      Power of :    POLST:

## 2021-01-17 NOTE — ED NOTES
Pts hiccups stopped after he vomited apx 50ml of dark liquid with possible appearance of coffee grounds     Ashlee Rivas RN  01/17/21 1079

## 2021-01-17 NOTE — ED ATTENDING ATTESTATION
1/17/2021  IGordon MD, saw and evaluated the patient  I have discussed the patient with the resident/non-physician practitioner and agree with the resident's/non-physician practitioner's findings, Plan of Care, and MDM as documented in the resident's/non-physician practitioner's note, except where noted  All available labs and Radiology studies were reviewed  I was present for key portions of any procedure(s) performed by the resident/non-physician practitioner and I was immediately available to provide assistance  At this point I agree with the current assessment done in the Emergency Department  I have conducted an independent evaluation of this patient a history and physical is as follows:    ED Course     Patient presents for evaluation due to abdominal pain, nausea, vomiting  Patient was recently admitted at [facility] for same complaints  Patient was found to have a hiatal hernia with possible volvulus and gastric outlet obstruction  Patient was treated conservatively with NG tube and was able to tolerate a p o  Challenge and barium swallow several days later  He was discharged on Thursday with intent for outpatient thoracic surgery follow-up  Today, patient had recurrence of symptoms  He came to [place] as this is where he would have been transferred if needed for thoracic surgery evaluation  No additional complaints  Exam: AAOx3, mild distress due to abdominal pain and vomiting, RRR, CTA, upper abdominal tenderness  A/P:  Abdominal pain, vomiting  Likely recurrence of gastric outlet obstruction  Will check labs, treat symptoms, and repeat CT scan to evaluate for obstruction  Discussed case with thoracic surgery who agrees with plan and also recommends putting an NG tube  Will ultimately plan to admit       Critical Care Time  Procedures

## 2021-01-17 NOTE — NURSING NOTE
After 2 unsuccessful attempts for NG tube, surgical resident called to bedside  Surgical resident was able to place NG tube successfully  Patient cleaned up and made comfortable at this time  Vitals stable  Will continue to monitor

## 2021-01-18 ENCOUNTER — ANESTHESIA EVENT (INPATIENT)
Dept: PERIOP | Facility: HOSPITAL | Age: 76
DRG: 327 | End: 2021-01-18
Payer: COMMERCIAL

## 2021-01-18 ENCOUNTER — ANESTHESIA (INPATIENT)
Dept: PERIOP | Facility: HOSPITAL | Age: 76
DRG: 327 | End: 2021-01-18
Payer: COMMERCIAL

## 2021-01-18 ENCOUNTER — APPOINTMENT (INPATIENT)
Dept: RADIOLOGY | Facility: HOSPITAL | Age: 76
DRG: 327 | End: 2021-01-18
Payer: COMMERCIAL

## 2021-01-18 VITALS — HEART RATE: 97 BPM

## 2021-01-18 LAB
ABO GROUP BLD: NORMAL
ABO GROUP BLD: NORMAL
ANION GAP SERPL CALCULATED.3IONS-SCNC: 1 MMOL/L (ref 4–13)
APTT PPP: 29 SECONDS (ref 23–37)
BASOPHILS # BLD AUTO: 0.04 THOUSANDS/ΜL (ref 0–0.1)
BASOPHILS NFR BLD AUTO: 0 % (ref 0–1)
BLD GP AB SCN SERPL QL: NEGATIVE
BUN SERPL-MCNC: 23 MG/DL (ref 5–25)
CALCIUM SERPL-MCNC: 8 MG/DL (ref 8.3–10.1)
CHLORIDE SERPL-SCNC: 108 MMOL/L (ref 100–108)
CO2 SERPL-SCNC: 34 MMOL/L (ref 21–32)
CREAT SERPL-MCNC: 1.66 MG/DL (ref 0.6–1.3)
EOSINOPHIL # BLD AUTO: 0.05 THOUSAND/ΜL (ref 0–0.61)
EOSINOPHIL NFR BLD AUTO: 0 % (ref 0–6)
ERYTHROCYTE [DISTWIDTH] IN BLOOD BY AUTOMATED COUNT: 14.7 % (ref 11.6–15.1)
GFR SERPL CREATININE-BSD FRML MDRD: 39 ML/MIN/1.73SQ M
GLUCOSE SERPL-MCNC: 90 MG/DL (ref 65–140)
HCT VFR BLD AUTO: 32.1 % (ref 36.5–49.3)
HGB BLD-MCNC: 10.1 G/DL (ref 12–17)
HGB BLD-MCNC: 11.1 G/DL (ref 12–17)
IMM GRANULOCYTES # BLD AUTO: 0.05 THOUSAND/UL (ref 0–0.2)
IMM GRANULOCYTES NFR BLD AUTO: 0 % (ref 0–2)
INR PPP: 1.06 (ref 0.84–1.19)
LYMPHOCYTES # BLD AUTO: 1.28 THOUSANDS/ΜL (ref 0.6–4.47)
LYMPHOCYTES NFR BLD AUTO: 11 % (ref 14–44)
MCH RBC QN AUTO: 29.5 PG (ref 26.8–34.3)
MCHC RBC AUTO-ENTMCNC: 31.5 G/DL (ref 31.4–37.4)
MCV RBC AUTO: 94 FL (ref 82–98)
MONOCYTES # BLD AUTO: 1.15 THOUSAND/ΜL (ref 0.17–1.22)
MONOCYTES NFR BLD AUTO: 10 % (ref 4–12)
NEUTROPHILS # BLD AUTO: 9.01 THOUSANDS/ΜL (ref 1.85–7.62)
NEUTS SEG NFR BLD AUTO: 79 % (ref 43–75)
NRBC BLD AUTO-RTO: 0 /100 WBCS
PLATELET # BLD AUTO: 230 THOUSANDS/UL (ref 149–390)
PMV BLD AUTO: 11.8 FL (ref 8.9–12.7)
POTASSIUM SERPL-SCNC: 3.8 MMOL/L (ref 3.5–5.3)
PROTHROMBIN TIME: 13.8 SECONDS (ref 11.6–14.5)
RBC # BLD AUTO: 3.42 MILLION/UL (ref 3.88–5.62)
RH BLD: POSITIVE
RH BLD: POSITIVE
SODIUM SERPL-SCNC: 143 MMOL/L (ref 136–145)
SPECIMEN EXPIRATION DATE: NORMAL
WBC # BLD AUTO: 11.58 THOUSAND/UL (ref 4.31–10.16)

## 2021-01-18 PROCEDURE — 43282 LAP PARAESOPH HER RPR W/MESH: CPT | Performed by: THORACIC SURGERY (CARDIOTHORACIC VASCULAR SURGERY)

## 2021-01-18 PROCEDURE — 86901 BLOOD TYPING SEROLOGIC RH(D): CPT | Performed by: PHYSICIAN ASSISTANT

## 2021-01-18 PROCEDURE — C1781 MESH (IMPLANTABLE): HCPCS | Performed by: THORACIC SURGERY (CARDIOTHORACIC VASCULAR SURGERY)

## 2021-01-18 PROCEDURE — 0DV44ZZ RESTRICTION OF ESOPHAGOGASTRIC JUNCTION, PERCUTANEOUS ENDOSCOPIC APPROACH: ICD-10-PCS | Performed by: THORACIC SURGERY (CARDIOTHORACIC VASCULAR SURGERY)

## 2021-01-18 PROCEDURE — 80048 BASIC METABOLIC PNL TOTAL CA: CPT | Performed by: SURGERY

## 2021-01-18 PROCEDURE — NC001 PR NO CHARGE: Performed by: PHYSICIAN ASSISTANT

## 2021-01-18 PROCEDURE — 71045 X-RAY EXAM CHEST 1 VIEW: CPT

## 2021-01-18 PROCEDURE — 0BUT4JZ SUPPLEMENT DIAPHRAGM WITH SYNTHETIC SUBSTITUTE, PERCUTANEOUS ENDOSCOPIC APPROACH: ICD-10-PCS | Performed by: THORACIC SURGERY (CARDIOTHORACIC VASCULAR SURGERY)

## 2021-01-18 PROCEDURE — 85730 THROMBOPLASTIN TIME PARTIAL: CPT | Performed by: PHYSICIAN ASSISTANT

## 2021-01-18 PROCEDURE — 88302 TISSUE EXAM BY PATHOLOGIST: CPT | Performed by: PATHOLOGY

## 2021-01-18 PROCEDURE — C9113 INJ PANTOPRAZOLE SODIUM, VIA: HCPCS | Performed by: SURGERY

## 2021-01-18 PROCEDURE — 86850 RBC ANTIBODY SCREEN: CPT | Performed by: PHYSICIAN ASSISTANT

## 2021-01-18 PROCEDURE — 85025 COMPLETE CBC W/AUTO DIFF WBC: CPT | Performed by: SURGERY

## 2021-01-18 PROCEDURE — 85018 HEMOGLOBIN: CPT | Performed by: STUDENT IN AN ORGANIZED HEALTH CARE EDUCATION/TRAINING PROGRAM

## 2021-01-18 PROCEDURE — 86900 BLOOD TYPING SEROLOGIC ABO: CPT | Performed by: PHYSICIAN ASSISTANT

## 2021-01-18 PROCEDURE — 85610 PROTHROMBIN TIME: CPT | Performed by: PHYSICIAN ASSISTANT

## 2021-01-18 PROCEDURE — 99223 1ST HOSP IP/OBS HIGH 75: CPT | Performed by: THORACIC SURGERY (CARDIOTHORACIC VASCULAR SURGERY)

## 2021-01-18 DEVICE — MESH BIO-A MESH GORE: Type: IMPLANTABLE DEVICE | Site: ESOPHAGUS | Status: FUNCTIONAL

## 2021-01-18 RX ORDER — CEFAZOLIN SODIUM 2 G/50ML
2000 SOLUTION INTRAVENOUS ONCE
Status: COMPLETED | OUTPATIENT
Start: 2021-01-18 | End: 2021-01-18

## 2021-01-18 RX ORDER — ONDANSETRON 2 MG/ML
4 INJECTION INTRAMUSCULAR; INTRAVENOUS ONCE AS NEEDED
Status: DISCONTINUED | OUTPATIENT
Start: 2021-01-18 | End: 2021-01-18 | Stop reason: HOSPADM

## 2021-01-18 RX ORDER — HYDROMORPHONE HCL 110MG/55ML
PATIENT CONTROLLED ANALGESIA SYRINGE INTRAVENOUS AS NEEDED
Status: DISCONTINUED | OUTPATIENT
Start: 2021-01-18 | End: 2021-01-18

## 2021-01-18 RX ORDER — FENTANYL CITRATE 50 UG/ML
INJECTION, SOLUTION INTRAMUSCULAR; INTRAVENOUS AS NEEDED
Status: DISCONTINUED | OUTPATIENT
Start: 2021-01-18 | End: 2021-01-18

## 2021-01-18 RX ORDER — LIDOCAINE HYDROCHLORIDE AND EPINEPHRINE 10; 10 MG/ML; UG/ML
INJECTION, SOLUTION INFILTRATION; PERINEURAL AS NEEDED
Status: DISCONTINUED | OUTPATIENT
Start: 2021-01-18 | End: 2021-01-18 | Stop reason: HOSPADM

## 2021-01-18 RX ORDER — ALBUMIN, HUMAN INJ 5% 5 %
SOLUTION INTRAVENOUS CONTINUOUS PRN
Status: DISCONTINUED | OUTPATIENT
Start: 2021-01-18 | End: 2021-01-18

## 2021-01-18 RX ORDER — ROCURONIUM BROMIDE 10 MG/ML
INJECTION, SOLUTION INTRAVENOUS AS NEEDED
Status: DISCONTINUED | OUTPATIENT
Start: 2021-01-18 | End: 2021-01-18

## 2021-01-18 RX ORDER — KETAMINE HYDROCHLORIDE 50 MG/ML
INJECTION, SOLUTION, CONCENTRATE INTRAMUSCULAR; INTRAVENOUS AS NEEDED
Status: DISCONTINUED | OUTPATIENT
Start: 2021-01-18 | End: 2021-01-18

## 2021-01-18 RX ORDER — CEFAZOLIN SODIUM 2 G/50ML
SOLUTION INTRAVENOUS AS NEEDED
Status: DISCONTINUED | OUTPATIENT
Start: 2021-01-18 | End: 2021-01-18

## 2021-01-18 RX ORDER — FENTANYL CITRATE/PF 50 MCG/ML
50 SYRINGE (ML) INJECTION
Status: DISCONTINUED | OUTPATIENT
Start: 2021-01-18 | End: 2021-01-18 | Stop reason: HOSPADM

## 2021-01-18 RX ORDER — SODIUM CHLORIDE, SODIUM LACTATE, POTASSIUM CHLORIDE, CALCIUM CHLORIDE 600; 310; 30; 20 MG/100ML; MG/100ML; MG/100ML; MG/100ML
50 INJECTION, SOLUTION INTRAVENOUS CONTINUOUS
Status: DISCONTINUED | OUTPATIENT
Start: 2021-01-18 | End: 2021-01-19

## 2021-01-18 RX ORDER — SUCCINYLCHOLINE/SOD CL,ISO/PF 100 MG/5ML
SYRINGE (ML) INTRAVENOUS AS NEEDED
Status: DISCONTINUED | OUTPATIENT
Start: 2021-01-18 | End: 2021-01-18

## 2021-01-18 RX ORDER — HYDROMORPHONE HCL/PF 1 MG/ML
0.4 SYRINGE (ML) INJECTION
Status: DISCONTINUED | OUTPATIENT
Start: 2021-01-18 | End: 2021-01-18 | Stop reason: HOSPADM

## 2021-01-18 RX ORDER — SODIUM CHLORIDE 9 MG/ML
INJECTION, SOLUTION INTRAVENOUS CONTINUOUS PRN
Status: DISCONTINUED | OUTPATIENT
Start: 2021-01-18 | End: 2021-01-18

## 2021-01-18 RX ORDER — METOCLOPRAMIDE HYDROCHLORIDE 5 MG/ML
10 INJECTION INTRAMUSCULAR; INTRAVENOUS ONCE AS NEEDED
Status: DISCONTINUED | OUTPATIENT
Start: 2021-01-18 | End: 2021-01-18 | Stop reason: HOSPADM

## 2021-01-18 RX ORDER — DEXAMETHASONE SODIUM PHOSPHATE 10 MG/ML
INJECTION, SOLUTION INTRAMUSCULAR; INTRAVENOUS AS NEEDED
Status: DISCONTINUED | OUTPATIENT
Start: 2021-01-18 | End: 2021-01-18

## 2021-01-18 RX ORDER — ONDANSETRON 2 MG/ML
INJECTION INTRAMUSCULAR; INTRAVENOUS AS NEEDED
Status: DISCONTINUED | OUTPATIENT
Start: 2021-01-18 | End: 2021-01-18

## 2021-01-18 RX ORDER — PROMETHAZINE HYDROCHLORIDE 25 MG/ML
12.5 INJECTION, SOLUTION INTRAMUSCULAR; INTRAVENOUS ONCE AS NEEDED
Status: DISCONTINUED | OUTPATIENT
Start: 2021-01-18 | End: 2021-01-18 | Stop reason: HOSPADM

## 2021-01-18 RX ORDER — ALBUTEROL SULFATE 2.5 MG/3ML
2.5 SOLUTION RESPIRATORY (INHALATION) ONCE AS NEEDED
Status: DISCONTINUED | OUTPATIENT
Start: 2021-01-18 | End: 2021-01-18 | Stop reason: HOSPADM

## 2021-01-18 RX ORDER — HYDROMORPHONE HCL/PF 1 MG/ML
0.5 SYRINGE (ML) INJECTION
Status: DISCONTINUED | OUTPATIENT
Start: 2021-01-18 | End: 2021-01-18

## 2021-01-18 RX ORDER — PROPOFOL 10 MG/ML
INJECTION, EMULSION INTRAVENOUS AS NEEDED
Status: DISCONTINUED | OUTPATIENT
Start: 2021-01-18 | End: 2021-01-18

## 2021-01-18 RX ORDER — LIDOCAINE HYDROCHLORIDE 10 MG/ML
INJECTION, SOLUTION EPIDURAL; INFILTRATION; INTRACAUDAL; PERINEURAL AS NEEDED
Status: DISCONTINUED | OUTPATIENT
Start: 2021-01-18 | End: 2021-01-18

## 2021-01-18 RX ADMIN — ROCURONIUM BROMIDE 10 MG: 50 INJECTION, SOLUTION INTRAVENOUS at 17:39

## 2021-01-18 RX ADMIN — LIDOCAINE HYDROCHLORIDE 50 MG: 10 INJECTION, SOLUTION EPIDURAL; INFILTRATION; INTRACAUDAL; PERINEURAL at 14:51

## 2021-01-18 RX ADMIN — DEXAMETHASONE SODIUM PHOSPHATE 10 MG: 10 INJECTION, SOLUTION INTRAMUSCULAR; INTRAVENOUS at 15:09

## 2021-01-18 RX ADMIN — SODIUM CHLORIDE, SODIUM LACTATE, POTASSIUM CHLORIDE, AND CALCIUM CHLORIDE 500 ML: .6; .31; .03; .02 INJECTION, SOLUTION INTRAVENOUS at 02:58

## 2021-01-18 RX ADMIN — ONDANSETRON 4 MG: 2 INJECTION INTRAMUSCULAR; INTRAVENOUS at 19:26

## 2021-01-18 RX ADMIN — HEPARIN SODIUM 5000 UNITS: 5000 INJECTION INTRAVENOUS; SUBCUTANEOUS at 05:27

## 2021-01-18 RX ADMIN — HYDROMORPHONE HYDROCHLORIDE 0.5 MG: 2 INJECTION, SOLUTION INTRAMUSCULAR; INTRAVENOUS; SUBCUTANEOUS at 16:35

## 2021-01-18 RX ADMIN — METRONIDAZOLE 500 MG: 500 INJECTION, SOLUTION INTRAVENOUS at 10:49

## 2021-01-18 RX ADMIN — ALBUMIN (HUMAN): 12.5 INJECTION, SOLUTION INTRAVENOUS at 16:46

## 2021-01-18 RX ADMIN — PHENYLEPHRINE HYDROCHLORIDE 100 MCG: 10 INJECTION INTRAVENOUS at 18:24

## 2021-01-18 RX ADMIN — PANTOPRAZOLE SODIUM 40 MG: 40 INJECTION, POWDER, FOR SOLUTION INTRAVENOUS at 21:11

## 2021-01-18 RX ADMIN — CEFAZOLIN SODIUM 2000 MG: 2 SOLUTION INTRAVENOUS at 10:49

## 2021-01-18 RX ADMIN — SODIUM CHLORIDE, SODIUM LACTATE, POTASSIUM CHLORIDE, AND CALCIUM CHLORIDE 125 ML/HR: .6; .31; .03; .02 INJECTION, SOLUTION INTRAVENOUS at 05:29

## 2021-01-18 RX ADMIN — PROPOFOL 100 MG: 10 INJECTION, EMULSION INTRAVENOUS at 14:51

## 2021-01-18 RX ADMIN — ROCURONIUM BROMIDE 50 MG: 50 INJECTION, SOLUTION INTRAVENOUS at 16:40

## 2021-01-18 RX ADMIN — HEPARIN SODIUM 5000 UNITS: 5000 INJECTION INTRAVENOUS; SUBCUTANEOUS at 21:11

## 2021-01-18 RX ADMIN — METRONIDAZOLE 500 MG: 500 INJECTION, SOLUTION INTRAVENOUS at 15:05

## 2021-01-18 RX ADMIN — CEFAZOLIN SODIUM 2000 MG: 2 SOLUTION INTRAVENOUS at 15:05

## 2021-01-18 RX ADMIN — Medication 100 MG: at 14:51

## 2021-01-18 RX ADMIN — SODIUM CHLORIDE: 0.9 INJECTION, SOLUTION INTRAVENOUS at 17:35

## 2021-01-18 RX ADMIN — PHENYLEPHRINE HYDROCHLORIDE 100 MCG: 10 INJECTION INTRAVENOUS at 18:23

## 2021-01-18 RX ADMIN — ROCURONIUM BROMIDE 10 MG: 50 INJECTION, SOLUTION INTRAVENOUS at 16:00

## 2021-01-18 RX ADMIN — ALBUMIN (HUMAN): 12.5 INJECTION, SOLUTION INTRAVENOUS at 14:58

## 2021-01-18 RX ADMIN — KETAMINE HYDROCHLORIDE 50 MG: 50 INJECTION, SOLUTION INTRAMUSCULAR; INTRAVENOUS at 14:51

## 2021-01-18 RX ADMIN — SODIUM CHLORIDE: 0.9 INJECTION, SOLUTION INTRAVENOUS at 14:56

## 2021-01-18 RX ADMIN — SODIUM CHLORIDE, SODIUM LACTATE, POTASSIUM CHLORIDE, AND CALCIUM CHLORIDE 500 ML: .6; .31; .03; .02 INJECTION, SOLUTION INTRAVENOUS at 01:54

## 2021-01-18 RX ADMIN — ONDANSETRON 4 MG: 2 INJECTION INTRAMUSCULAR; INTRAVENOUS at 15:09

## 2021-01-18 RX ADMIN — CEFAZOLIN SODIUM 2000 MG: 2 SOLUTION INTRAVENOUS at 19:02

## 2021-01-18 RX ADMIN — ROCURONIUM BROMIDE 30 MG: 50 INJECTION, SOLUTION INTRAVENOUS at 19:00

## 2021-01-18 RX ADMIN — ROCURONIUM BROMIDE 10 MG: 50 INJECTION, SOLUTION INTRAVENOUS at 18:48

## 2021-01-18 RX ADMIN — FENTANYL CITRATE 100 MCG: 50 INJECTION INTRAMUSCULAR; INTRAVENOUS at 14:51

## 2021-01-18 RX ADMIN — ROCURONIUM BROMIDE 30 MG: 50 INJECTION, SOLUTION INTRAVENOUS at 15:09

## 2021-01-18 RX ADMIN — ALBUMIN (HUMAN): 12.5 INJECTION, SOLUTION INTRAVENOUS at 15:11

## 2021-01-18 RX ADMIN — SUGAMMADEX 400 MG: 100 INJECTION, SOLUTION INTRAVENOUS at 19:35

## 2021-01-18 RX ADMIN — PHENYLEPHRINE HYDROCHLORIDE 20 MCG/MIN: 10 INJECTION INTRAVENOUS at 14:57

## 2021-01-18 RX ADMIN — ROCURONIUM BROMIDE 10 MG: 50 INJECTION, SOLUTION INTRAVENOUS at 18:58

## 2021-01-18 RX ADMIN — PANTOPRAZOLE SODIUM 40 MG: 40 INJECTION, POWDER, FOR SOLUTION INTRAVENOUS at 08:20

## 2021-01-18 RX ADMIN — Medication: at 20:14

## 2021-01-18 NOTE — QUICK NOTE
Post-Op Check Note - Thoracic Surgery     S: Patient is s/p laparoscopic paraesophageal hernia repair with mesh and with Toupet fundoplication earlier this evening  Patient tolerated the procedure well without any complications  Post-operatively, patient states he is feeling well  O:   Vitals:    01/18/21 2155   BP: 150/85   Pulse: 103   Resp: 15   Temp: 98 6 °F (37 °C)   SpO2: 96%       I/O last 3 completed shifts:   In: 5585 4 [I V :3835 4; IV Piggyback:1750]  Out: 3439 [Urine:1420; Emesis/NG output:3450]  I/O this shift:  In: 600 2 [I V :600 2]  Out: 20 [Urine:20]    PE:  NAD, alert and oriented x3  Normocephalic, atraumatic  MMM, EOMI, PERRLA  Norm resp effort on 2 L NC  RRR  Abd soft, NT/ND, incisions c/d/i   No calf tenderness or peripheral edema  Motor/sensation intact in distal extremities  CN grossly intact  -rash/lesions      Lab Results   Component Value Date    WBC 11 58 (H) 01/18/2021    HGB 11 1 (L) 01/18/2021    HCT 32 1 (L) 01/18/2021    MCV 94 01/18/2021     01/18/2021     Lab Results   Component Value Date    CALCIUM 8 0 (L) 01/18/2021    K 3 8 01/18/2021    CO2 34 (H) 01/18/2021     01/18/2021    BUN 23 01/18/2021    CREATININE 1 66 (H) 01/18/2021     PACU CXR: no ptx - awaiting final read     A/P: 66 y o  M w/ hx of a paraesophageal hernia, now s/p  laparoscopic paraesophageal hernia repair 1/18    - Sips of shine [email or web address]  - PCA-D  - pulmonary toilet  - OOB/Ambulate   - DVT ppx  - swallow evaluation in the morning, consider advancing diet pending results of study     Yesenia Iglesias DO

## 2021-01-18 NOTE — UTILIZATION REVIEW
Notification of Discharge  This is a Notification of Discharge from our facility [address]  Please be advised that this patient has been discharge from our facility  Below you will find the admission and discharge date and time including the patients disposition  PRESENTATION DATE: 1/10/2021  6:10 PM  OBS ADMISSION DATE:   IP ADMISSION DATE: 1/10/21 2311   DISCHARGE DATE: 1/14/2021  2:08 PM  DISPOSITION: Home/Self Care Home/Self Care   Admission Orders listed below:  Admission Orders (From admission, onward)     Ordered        01/10/21 2314  Inpatient Admission  Once                   Please contact the UR Department if additional information is required to close this patient's authorization/case  [address] [employer] Utilization Review Department  Main: [phone] x carefully listen to the prompts  All voicemails are confidential   [email or web address]  Send all requests for admission clinical reviews, approved or denied determinations and any other requests to dedicated fax number below belonging to the campus where the patient is receiving treatment   List of dedicated fax numbers:  [address] DENIALS (Administrative/Medical Necessity) [phone]   [address] (Maternity/NICU/Pediatrics) [phone]   [place] Needs [phone]   Sole Camacho [phone]   Pacheco Renner [phone]   Annabella Settle [facility] [address] [place] [address] [phone]   [facility]  [phone]   [address] [facility] [address] [facility]  [address] [facility] [address] [facility] [phone]

## 2021-01-18 NOTE — ANESTHESIA PREPROCEDURE EVALUATION
Procedure:  REPAIR HERNIA PARAESOPHAGEAL  LAPAROSCOPIC (N/A Chest)  ESOPHAGOGASTRODUODENOSCOPY (EGD) (N/A Esophagus)    Relevant Problems   GI/HEPATIC   (+) Hiatal hernia      Other   (+) Lang's esophagus without dysplasia   (+) Duodenal nodule        Physical Exam    Airway    Mallampati score: II  TM Distance: >3 FB  Neck ROM: full     Dental   No notable dental hx     Cardiovascular  Cardiovascular exam normal    Pulmonary  Pulmonary exam normal Breath sounds clear to auscultation,     Other Findings      Sinus tachycardia with Premature atrial complexes  Nonspecific ST and T wave abnormality  Abnormal ECG  No previous ECGs available  Confirmed by Domenica Ramirez ([id number]) on 1/11/2021 12:35:10 PM  Anesthesia Plan  ASA Score- 2     Anesthesia Type- general with ASA Monitors  Additional Monitors:   Airway Plan: ETT  Plan Factors-Exercise tolerance (METS): >4 METS  Chart reviewed  EKG reviewed  Imaging results reviewed  Existing labs reviewed  Patient summary reviewed  Patient is not a current smoker  Patient instructed to abstain from smoking on day of procedure  Patient did not smoke on day of surgery  Obstructive sleep apnea risk education given perioperatively  Induction- intravenous and rapid sequence induction  Postoperative Plan- Plan for postoperative opioid use  Planned trial extubation    Informed Consent- Anesthetic plan and risks discussed with patient  I personally reviewed this patient with the CRNA  Discussed and agreed on the Anesthesia Plan with the CRNA             Lab Results   Component Value Date    WBC 11 58 (H) 01/18/2021    HGB 10 1 (L) 01/18/2021    HCT 32 1 (L) 01/18/2021    MCV 94 01/18/2021     01/18/2021     Lab Results   Component Value Date    CALCIUM 8 0 (L) 01/18/2021    K 3 8 01/18/2021    CO2 34 (H) 01/18/2021     01/18/2021    BUN 23 01/18/2021    CREATININE 1 66 (H) 01/18/2021         Discussed with pt the benefits/alternatives and risks or General Anesthesia including breathing tube remaining in place if not strong enough, PONV, damage to lips and teeth, sore throat, eye injury or blindness    I, Dr Mary Blackburn, the attending physician, have personally seen and evaluated the patient prior to anesthetic care  I have reviewed the pre-anesthetic record, and other medical records if appropriate to the anesthetic care  If a CRNA is involved in the case, I have reviewed the CRNA assessment, if present, and agree  The patient is in a suitable condition to proceed with my formulated anesthetic plan

## 2021-01-18 NOTE — PROGRESS NOTES
Progress Note - Thoracic Surgery   Christina Galeano 66 y o  male MRN: [id number]  Unit/Bed#: [facility] 824-01 Encounter: [id number]    Assessment:  64yo M with large hiatal hernia c/b gastric outlet obstruction    NG tube: 2700 mL (2200 mL immediately upon placement)  UOP: 550 mL    Plan:   PEH repair this admission -- timing TBD   Continue NPO/NGT   Continue IVF   Protonix BID   OOB/ambulate   DVT PPx    Subjective/Objective     Subjective:   Mildly tachycardic overnight; eceived 1L crystalloid bolus  Supplemental oxygen requirement slightly increased: 3L NC from 2L Abdomen feels much better since NG tube was placed  Objective:    Blood pressure 121/67, pulse 91, temperature 99 2 °F (37 3 °C), resp  rate 15, height 5' 11" (1 803 m), weight 81 3 kg (179 lb 3 7 oz), SpO2 (!) 89 %  ,Body mass index is 25 kg/m²        Intake/Output Summary (Last 24 hours) at 1/18/2021 5058  Last data filed at 1/18/2021 0536  Gross per 24 hour   Intake 2835 42 ml   Output 3000 ml   Net -164 58 ml       Invasive Devices     Peripheral Intravenous Line            Peripheral IV 01/17/21 Right Forearm less than 1 day          Drain            NG/OG/Enteral Tube Nasogastric Right nares less than 1 day                Physical Exam:   Gen:  NAD  HENT: NGT in place, brown (non-bilious) gastric-appearing contents in canister  CV:  warm, well-perfused  Lungs: nl effort on 3L NC  Abd:  soft, no significant tenderness, mild distension  Ext:  no CCE  Neuro: A&Ox3     Results from last 7 days   Lab Units 01/17/21  1643 01/17/21  1045 01/13/21  0547 01/12/21  0516   WBC Thousand/uL  --  13 03* 9 32 8 90   HEMOGLOBIN g/dL  --  13 1 11 7* 11 1*   HEMATOCRIT %  --  40 7 37 7 36 0*   PLATELETS Thousands/uL 289 304 196 183     Results from last 7 days   Lab Units 01/18/21  0529 01/17/21  1045 01/13/21  0547   POTASSIUM mmol/L 3 8 3 9 4 0   CHLORIDE mmol/L 108 109* 108   CO2 mmol/L 34* 31 27   BUN mg/dL 23 24 14   CREATININE mg/dL 1 66* 1 88* 1 30   CALCIUM mg/dL 8 0* 9 2 7 8*

## 2021-01-18 NOTE — UTILIZATION REVIEW
Initial Clinical Review    Admission: Date/Time/Statement:   Admission Orders (From admission, onward)     Ordered        01/17/21 1331  INPATIENT ADMISSION  Once                   Orders Placed This Encounter   Procedures    INPATIENT ADMISSION     Standing Status:   Standing     Number of Occurrences:   1     Order Specific Question:   Level of Care     Answer:   Med Surg [16]     Order Specific Question:   Estimated length of stay     Answer:   More than 2 Midnights     Order Specific Question:   Certification     Answer:   I certify that inpatient services are medically necessary for this patient for a duration of greater than two midnights  See H&P and MD Progress Notes for additional information about the patient's course of treatment  ED Arrival Information     Expected Arrival Acuity Means of Arrival Escorted By Service Admission Type    - 1/17/2021 09:36 Emergent Walk-In Self Thoracic Surgery Emergency    Arrival Complaint    hernia        Chief Complaint   Patient presents with   Roberta Huertas told pt to come to ED today as he is still have many issues with his hernia, pt recently d/c from [facility] on 1/14 after many tests and evaluation  pt arrives today with hiccups, discomfort in abd some nausea and vomiting, no blood     Assessment/Plan:   Mr Shea Macdonald is a 67 yo male who presents to the ED at the direction of surgeon with c/o less oral intake tolerance with abd distention, pain with N/V and burping  Pt has  known giant paraesophageal hernia and was recently d/c from hospital for same  One week ago he had obstruction but not gangrene, was decompressed with an NG tube and eventually tolerated a soft diet and d/c to home with f/u scheduled for this coming week  He is admitted to INPATIENT status with Incarcerated and obstructed giant paraesophageal hernia, ISAAC on CKD - NPO, NGT, IV fluids, will have surgery 1/18  Symptoms are resolved since NGT was inserted        1/18 for OR today, NGT output is a total of 2700 ml, UO is 550 ml  Protonix IV BID, continue NGT, IV fluids, NPO  Pt is on the schedule for 1330 today       ++++++++++++++++++++++++++++++  1/18 OPERATIVE NOTE      ++++++++++++++++++++++++++++++    ED Triage Vitals   Temperature Pulse Respirations Blood Pressure SpO2   01/17/21 1012 01/17/21 1012 01/17/21 1012 01/17/21 1012 01/17/21 1012   98 2 °F (36 8 °C) 98 20 159/95 97 %      Temp Source Heart Rate Source Patient Position - Orthostatic VS BP Location FiO2 (%)   01/17/21 1012 01/17/21 1400 -- 01/17/21 1100 --   Oral Monitor  Left arm       Pain Score       01/17/21 1012       5          Wt Readings from Last 1 Encounters:   01/17/21 81 3 kg (179 lb 3 7 oz)     Additional Vital Signs:   01/18/21 08:02:38  97 8 °F (36 6 °C)  86  20  152/80  104  95 %  --  --  --   01/18/21 0159  --  --  --  --  --  --  32  3 L/min  Nasal cannula   01/17/21 23:26:31  99 2 °F (37 3 °C)  91  15  121/67  85  89 %Abnormal   --  --  --   01/17/21 23:26:10  99 2 °F (37 3 °C)  --  15  121/67  85  --  --  --  --   01/17/21 2039  --  --  --  --  --  --  --  --  None (Room air)   01/17/21 1600  --  --  --  --  --  --  --  --  None (Room air)   01/17/21 1456  98 8 °F (37 1 °C)  105  18  150/98  --  90 %  28  2 L/min  Nasal cannula   01/17/21 1430  --  106Abnormal   20  145/101Abnormal   118  93 %  28  2 L/min  --   01/17/21 1400  --  104  18  118/81  98  94 %  28  2 L/min  Nasal cannula   01/17/21 1230  --  128Abnormal   20  183/98Abnormal   129  95 %  --  --  None (Room air)   01/17/21 1200  --  94  18  160/92  114  94 %  --  --  None (Room air)   01/17/21 1100  --  96  18  155/87  111  95 %  --  --  None (Room air)   01/17/21 1030  --  58  18  152/92  110  98 %  --  --  --     Pertinent Labs/Diagnostic Test Results:     1/17 CT AP - No significant interval change since prior examinations  Redemonstration of marked distention of the stomach with fluid and a large complex paraesophageal type hiatal hernia  Presence of a gastric volvulus is not entirely excluded however does not appear to be clearly present on recent upper GI examination  Surgical team has been consulted as the patient presents with persistent nausea vomiting        Results from last 7 days   Lab Units 01/18/21  0529 01/17/21  1643 01/17/21  1045 01/13/21  0547 01/12/21  0516   WBC Thousand/uL 11 58*  --  13 03* 9 32 8 90   HEMOGLOBIN g/dL 10 1*  --  13 1 11 7* 11 1*   HEMATOCRIT % 32 1*  --  40 7 37 7 36 0*   PLATELETS Thousands/uL 230 289 304 196 183   NEUTROS ABS Thousands/µL 9 01*  --  10 10* 6 92  --          Results from last 7 days   Lab Units 01/18/21  0529 01/17/21  1045 01/13/21  0547 01/12/21  0516   SODIUM mmol/L 143 146* 140 145   POTASSIUM mmol/L 3 8 3 9 4 0 4 1   CHLORIDE mmol/L 108 109* 108 112*   CO2 mmol/L 34* 31 27 30   ANION GAP mmol/L 1* 6 5 3*   BUN mg/dL 23 24 14 22   CREATININE mg/dL 1 66* 1 88* 1 30 1 56*   EGFR ml/min/1 73sq m 39 34 53 42   CALCIUM mg/dL 8 0* 9 2 7 8* 7 8*   MAGNESIUM mg/dL  --   --   --  1 9     Results from last 7 days   Lab Units 01/17/21  1045   AST U/L 24   ALT U/L 52   ALK PHOS U/L 87   TOTAL PROTEIN g/dL 7 8   ALBUMIN g/dL 3 1*   TOTAL BILIRUBIN mg/dL 0 39         Results from last 7 days   Lab Units 01/18/21  0529 01/17/21  1045 01/13/21  0547 01/12/21  0516   GLUCOSE RANDOM mg/dL 90 116 119 108     Results from last 7 days   Lab Units 01/17/21  1045   LACTIC ACID mmol/L 1 3     Results from last 7 days   Lab Units 01/17/21  1045   LIPASE u/L 496*     ED Treatment:   Medication Administration from 01/17/2021 0936 to 01/17/2021 1455    Date/Time Order Dose Route Action   01/17/2021 1044 sodium chloride 0 9 % bolus 1,000 mL 1,000 mL Intravenous New Bag   01/17/2021 1048 ondansetron (ZOFRAN) injection 4 mg 4 mg Intravenous Given   01/17/2021 1126 iohexol (OMNIPAQUE) 350 MG/ML injection (MULTI-DOSE) 100 mL 100 mL Intravenous Given   01/17/2021 1424 lactated ringers infusion 125 mL/hr Intravenous New Bag 01/17/2021 1426 heparin (porcine) subcutaneous injection 5,000 Units 5,000 Units Subcutaneous Given        Past Medical History:   Diagnosis Date    GERD (gastroesophageal reflux disease)     Hernia, internal 01/10/2021    Hypertension      Present on Admission:   Hiatal hernia   Lang's esophagus without dysplasia   Duodenal nodule    Admitting Diagnosis: Hiatal hernia [K44 9]  Hernia, hiatal [K44 9]  Abdominal pain [R10 9]  Nausea & vomiting [R11 2]     Age/Sex: 66 y o  male     Admission Orders:    Scheduled Medications:  cefazolin, 2,000 mg, Intravenous, Once  heparin (porcine), 5,000 Units, Subcutaneous, Q8H [facility]  iohexol, 50 mL, Oral, 90 min pre-procedure  metroNIDAZOLE, 500 mg, Intravenous, Q8H  pantoprazole, 40 mg, Intravenous, Q12H [facility]      Continuous IV Infusions:  lactated ringers, 125 mL/hr, Intravenous, Continuous      PRN Meds:  HYDROmorphone, 0 5 mg, Intravenous, Q4H PRN  HYDROmorphone, 0 5 mg, Intravenous, Q3H PRN  Labetalol HCl, 10 mg, Intravenous, Q6H PRN  morphine injection, 2 mg, Intravenous, Q4H PRN  ondansetron, 4 mg, Intravenous, Q6H PRN  phenol, 2 spray, Mouth/Throat, Q2H PRN    SCDs  NPO   NGT to LCWS  Up as mitch  OR 1/18    Network Utilization Review Department  ATTENTION: Please call with any questions or concerns to [phone] and carefully listen to the prompts so that you are directed to the right person  All voicemails are confidential   Kemi Hernandez all requests for admission clinical reviews, approved or denied determinations and any other requests to dedicated fax number below belonging to the campus where the patient is receiving treatment   List of dedicated fax numbers for the Facilities:  [address] DENIALS (Administrative/Medical Necessity) [phone]   [address] (Maternity/NICU/Pediatrics) [phone]   [address] [place] [address] 5255 [facility] [phone]   [facility] [address]) [address] [place] [address] [address] [phone]   [place] [address] [phone]

## 2021-01-19 ENCOUNTER — APPOINTMENT (INPATIENT)
Dept: RADIOLOGY | Facility: HOSPITAL | Age: 76
DRG: 327 | End: 2021-01-19
Payer: COMMERCIAL

## 2021-01-19 LAB
ANION GAP SERPL CALCULATED.3IONS-SCNC: 1 MMOL/L (ref 4–13)
BASOPHILS # BLD AUTO: 0 THOUSANDS/ΜL (ref 0–0.1)
BASOPHILS NFR BLD AUTO: 0 % (ref 0–1)
BUN SERPL-MCNC: 23 MG/DL (ref 5–25)
CALCIUM SERPL-MCNC: 7.7 MG/DL (ref 8.3–10.1)
CHLORIDE SERPL-SCNC: 114 MMOL/L (ref 100–108)
CO2 SERPL-SCNC: 31 MMOL/L (ref 21–32)
CREAT SERPL-MCNC: 1.58 MG/DL (ref 0.6–1.3)
EOSINOPHIL # BLD AUTO: 0 THOUSAND/ΜL (ref 0–0.61)
EOSINOPHIL NFR BLD AUTO: 0 % (ref 0–6)
ERYTHROCYTE [DISTWIDTH] IN BLOOD BY AUTOMATED COUNT: 14.4 % (ref 11.6–15.1)
GFR SERPL CREATININE-BSD FRML MDRD: 42 ML/MIN/1.73SQ M
GLUCOSE SERPL-MCNC: 136 MG/DL (ref 65–140)
HCT VFR BLD AUTO: 28.4 % (ref 36.5–49.3)
HGB BLD-MCNC: 9 G/DL (ref 12–17)
IMM GRANULOCYTES # BLD AUTO: 0.05 THOUSAND/UL (ref 0–0.2)
IMM GRANULOCYTES NFR BLD AUTO: 1 % (ref 0–2)
LYMPHOCYTES # BLD AUTO: 0.47 THOUSANDS/ΜL (ref 0.6–4.47)
LYMPHOCYTES NFR BLD AUTO: 5 % (ref 14–44)
MCH RBC QN AUTO: 29.8 PG (ref 26.8–34.3)
MCHC RBC AUTO-ENTMCNC: 31.7 G/DL (ref 31.4–37.4)
MCV RBC AUTO: 94 FL (ref 82–98)
MONOCYTES # BLD AUTO: 0.69 THOUSAND/ΜL (ref 0.17–1.22)
MONOCYTES NFR BLD AUTO: 7 % (ref 4–12)
NEUTROPHILS # BLD AUTO: 8.2 THOUSANDS/ΜL (ref 1.85–7.62)
NEUTS SEG NFR BLD AUTO: 87 % (ref 43–75)
NRBC BLD AUTO-RTO: 0 /100 WBCS
PLATELET # BLD AUTO: 206 THOUSANDS/UL (ref 149–390)
PMV BLD AUTO: 10.9 FL (ref 8.9–12.7)
POTASSIUM SERPL-SCNC: 4.2 MMOL/L (ref 3.5–5.3)
RBC # BLD AUTO: 3.02 MILLION/UL (ref 3.88–5.62)
SODIUM SERPL-SCNC: 146 MMOL/L (ref 136–145)
WBC # BLD AUTO: 9.41 THOUSAND/UL (ref 4.31–10.16)

## 2021-01-19 PROCEDURE — 85025 COMPLETE CBC W/AUTO DIFF WBC: CPT | Performed by: SURGERY

## 2021-01-19 PROCEDURE — 74220 X-RAY XM ESOPHAGUS 1CNTRST: CPT

## 2021-01-19 PROCEDURE — C9113 INJ PANTOPRAZOLE SODIUM, VIA: HCPCS | Performed by: SURGERY

## 2021-01-19 PROCEDURE — 80048 BASIC METABOLIC PNL TOTAL CA: CPT | Performed by: SURGERY

## 2021-01-19 PROCEDURE — BD11YZZ FLUOROSCOPY OF ESOPHAGUS USING OTHER CONTRAST: ICD-10-PCS | Performed by: RADIOLOGY

## 2021-01-19 RX ORDER — LISINOPRIL 10 MG/1
10 TABLET ORAL DAILY
Status: DISCONTINUED | OUTPATIENT
Start: 2021-01-19 | End: 2021-01-21 | Stop reason: HOSPADM

## 2021-01-19 RX ORDER — AMLODIPINE BESYLATE 5 MG/1
5 TABLET ORAL DAILY
Status: DISCONTINUED | OUTPATIENT
Start: 2021-01-19 | End: 2021-01-21 | Stop reason: HOSPADM

## 2021-01-19 RX ORDER — OXYCODONE HYDROCHLORIDE 5 MG/1
2.5 TABLET ORAL EVERY 4 HOURS PRN
Status: DISCONTINUED | OUTPATIENT
Start: 2021-01-19 | End: 2021-01-21 | Stop reason: HOSPADM

## 2021-01-19 RX ORDER — OXYCODONE HYDROCHLORIDE 5 MG/1
5 TABLET ORAL EVERY 4 HOURS PRN
Status: DISCONTINUED | OUTPATIENT
Start: 2021-01-19 | End: 2021-01-21 | Stop reason: HOSPADM

## 2021-01-19 RX ORDER — ACETAMINOPHEN 325 MG/1
650 TABLET ORAL EVERY 6 HOURS PRN
Status: DISCONTINUED | OUTPATIENT
Start: 2021-01-19 | End: 2021-01-21 | Stop reason: HOSPADM

## 2021-01-19 RX ADMIN — AMLODIPINE BESYLATE 5 MG: 5 TABLET ORAL at 10:15

## 2021-01-19 RX ADMIN — HEPARIN SODIUM 5000 UNITS: 5000 INJECTION INTRAVENOUS; SUBCUTANEOUS at 21:54

## 2021-01-19 RX ADMIN — PANTOPRAZOLE SODIUM 40 MG: 40 INJECTION, POWDER, FOR SOLUTION INTRAVENOUS at 08:33

## 2021-01-19 RX ADMIN — HEPARIN SODIUM 5000 UNITS: 5000 INJECTION INTRAVENOUS; SUBCUTANEOUS at 13:45

## 2021-01-19 RX ADMIN — IOHEXOL 50 ML: 350 INJECTION, SOLUTION INTRAVENOUS at 09:05

## 2021-01-19 RX ADMIN — LISINOPRIL 10 MG: 10 TABLET ORAL at 10:15

## 2021-01-19 RX ADMIN — PANTOPRAZOLE SODIUM 40 MG: 40 INJECTION, POWDER, FOR SOLUTION INTRAVENOUS at 21:54

## 2021-01-19 RX ADMIN — OXYCODONE HYDROCHLORIDE 2.5 MG: 5 TABLET ORAL at 19:55

## 2021-01-19 RX ADMIN — HEPARIN SODIUM 5000 UNITS: 5000 INJECTION INTRAVENOUS; SUBCUTANEOUS at 05:37

## 2021-01-19 NOTE — PROGRESS NOTES
Progress Note - Thoracic Surgery   Jeff Mckenzie 66 y o  male MRN: [id number]  Unit/Bed#: [facility] 824-01 Encounter: [id number]    Assessment:  66 y o  M w/ hx of a paraesophageal hernia, now s/p laparoscopic paraesophageal hernia repair 1/18    Afebrile  VSS  On 2 L NC    Plan:  Continue Sips of clears  Will obtain swallow study today and advance diet based on results of swallow  D/c IVFs once tolerating diet  Prn analgesia - patient not using PCA-D, will transition off PCA  OOB/Ambulate  DVT ppx      Subjective/Objective     Subjective: POD1 from laparoscopic paraesophageal hernia repair  No acute events overnight  Pain is controlled on prn analgesia  Tolerating sips of clears  No fevers, chills  Objective:    Blood pressure 144/81, pulse 91, temperature 98 5 °F (36 9 °C), resp  rate 16, height 5' 11" (1 803 m), weight 81 3 kg (179 lb 3 7 oz), SpO2 96 %  ,Body mass index is 25 kg/m²        Intake/Output Summary (Last 24 hours) at 1/19/2021 0531  Last data filed at 1/19/2021 0328  Gross per 24 hour   Intake 3350 2 ml   Output 2340 ml   Net 1010 2 ml       Invasive Devices     Peripheral Intravenous Line            Peripheral IV 01/17/21 Right Forearm 1 day    Peripheral IV 01/18/21 Left Hand less than 1 day          Drain            NG/OG/Enteral Tube Nasogastric Right nares 1 day    NG/OG/Enteral Tube less than 1 day                Physical Exam:   NAD, alert and oriented x3  Normocephalic, atraumatic  MMM, EOMI, PERRLA  Norm resp effort on 2L NC  RRR  Abd soft, NT/ND, incisions c/d/i  No calf tenderness or peripheral edema  Motor/sensation intact in distal extremities  CN grossly intact  -rash/lesions      Lab, Imaging and other studies:  CBC:   Lab Results   Component Value Date    WBC 9 41 01/19/2021    HGB 9 0 (L) 01/19/2021    HCT 28 4 (L) 01/19/2021    MCV 94 01/19/2021     01/19/2021    MCH 29 8 01/19/2021    MCHC 31 7 01/19/2021    RDW 14 4 01/19/2021    MPV 10 9 01/19/2021    NRBC 0 01/19/2021   , CMP: No results found for: SODIUM, K, CL, CO2, ANIONGAP, BUN, CREATININE, GLUCOSE, CALCIUM, AST, ALT, ALKPHOS, PROT, BILITOT, EGFR  VTE Pharmacologic Prophylaxis: Heparin  VTE Mechanical Prophylaxis: sequential compression device

## 2021-01-19 NOTE — UTILIZATION REVIEW
Initial Clinical Review    Admission: Date/Time/Statement:   Admission Orders (From admission, onward)     Ordered        01/17/21 1331  INPATIENT ADMISSION  Once                   Orders Placed This Encounter   Procedures    INPATIENT ADMISSION     Standing Status:   Standing     Number of Occurrences:   1     Order Specific Question:   Level of Care     Answer:   Med Surg [16]     Order Specific Question:   Estimated length of stay     Answer:   More than 2 Midnights     Order Specific Question:   Certification     Answer:   I certify that inpatient services are medically necessary for this patient for a duration of greater than two midnights  See H&P and MD Progress Notes for additional information about the patient's course of treatment  ED Arrival Information     Expected Arrival Acuity Means of Arrival Escorted By Service Admission Type    - 1/17/2021 09:36 Emergent Walk-In Self Thoracic Surgery Emergency    Arrival Complaint    hernia        Chief Complaint   Patient presents with   [facility]     Dr Gris Cobb told pt to come to ED today as he is still have many issues with his hernia, pt recently d/c from [facility] on 1/14 after many tests and evaluation  pt arrives today with hiccups, discomfort in abd some nausea and vomiting, no blood     Assessment/Plan:   Mr Alessandra Craft is a 67 yo male who presents to the ED at the direction of surgeon with c/o less oral intake tolerance with abd distention, pain with N/V and burping  Pt has  known giant paraesophageal hernia and was recently d/c from hospital for same  One week ago he had obstruction but not gangrene, was decompressed with an NG tube and eventually tolerated a soft diet and d/c to home with f/u scheduled for this coming week  He is admitted to INPATIENT status with Incarcerated and obstructed giant paraesophageal hernia, ISAAC on CKD - NPO, NGT, IV fluids, will have surgery 1/18  Symptoms are resolved since NGT was inserted        1/18 for OR today, NGT output is a total of 2700 ml, UO is 550 ml  Protonix IV BID, continue NGT, IV fluids, NPO    Pt is on the schedule for 1330 today       ++++++++++++++++++++++++++++++    OPERATIVE REPORT  PATIENT NAME: Jeff Mckenzie       SURGERY DATE: 1/18/2021     Preop Diagnosis:  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     Post-Op Diagnosis Codes:  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     Procedure(s) (LRB):  ESOPHAGOGASTRODUODENOSCOPY (EGD) (N/A)  LAPAROSCOPIC REPAIR OF PARAESOPHAGEAL HERNIA WITH MESH  PARTIAL FUNDOPLICATION     Specimen(s):  ID Type Source Tests Collected by Time Destination   1 : Para esophageal hernia sac  Tissue Esophagus TISSUE Echo Irving MD 1/18/2021 1911           Estimated Blood Loss:   Minimal       Anesthesia Type:   General        Operative Findings:  Giant type 3 paraesophageal hernia        ED Triage Vitals   Temperature Pulse Respirations Blood Pressure SpO2   01/17/21 1012 01/17/21 1012 01/17/21 1012 01/17/21 1012 01/17/21 1012   98 2 °F (36 8 °C) 98 20 159/95 97 %      Temp Source Heart Rate Source Patient Position - Orthostatic VS BP Location FiO2 (%)   01/17/21 1012 01/17/21 1400 -- 01/17/21 1100 --   Oral Monitor  Left arm       Pain Score       01/17/21 1012       5          Wt Readings from Last 1 Encounters:   01/17/21 81 3 kg (179 lb 3 7 oz)     Additional Vital Signs:   01/18/21 08:02:38  97 8 °F (36 6 °C)  86  20  152/80  104  95 %  --  --  --   01/18/21 0159  --  --  --  --  --  --  32  3 L/min  Nasal cannula   01/17/21 23:26:31  99 2 °F (37 3 °C)  91  15  121/67  85  89 %Abnormal   --  --  --   01/17/21 23:26:10  99 2 °F (37 3 °C)  --  15  121/67  85  --  --  --  --   01/17/21 2039  --  --  --  --  --  --  --  --  None (Room air)   01/17/21 1600  --  --  --  --  --  --  --  --  None (Room air)   01/17/21 1456  98 8 °F (37 1 °C)  105  18  150/98  --  90 %  28  2 L/min  Nasal cannula   01/17/21 1430  --  106Abnormal   20  145/101Abnormal   118  93 %  28  2 L/min  --   01/17/21 1400  --  104  18  118/81  98  94 %  28  2 L/min  Nasal cannula   01/17/21 1230  --  128Abnormal   20  183/98Abnormal   129  95 %  --  --  None (Room air)   01/17/21 1200  --  94  18  160/92  114  94 %  --  --  None (Room air)   01/17/21 1100  --  96  18  155/87  111  95 %  --  --  None (Room air)   01/17/21 1030  --  58  18  152/92  110  98 %  --  --  --     Pertinent Labs/Diagnostic Test Results:     1/17 CT AP - No significant interval change since prior examinations  Redemonstration of marked distention of the stomach with fluid and a large complex paraesophageal type hiatal hernia  Presence of a gastric volvulus is not entirely excluded however does not appear to be clearly present on recent upper GI examination  Surgical team has been consulted as the patient presents with persistent nausea vomiting        Results from last 7 days   Lab Units 01/19/21  0511 01/18/21  1044 01/18/21  0529 01/17/21  1643 01/17/21  1045 01/13/21  0547   WBC Thousand/uL 9 41  --  11 58*  --  13 03* 9 32   HEMOGLOBIN g/dL 9 0* 11 1* 10 1*  --  13 1 11 7*   HEMATOCRIT % 28 4*  --  32 1*  --  40 7 37 7   PLATELETS Thousands/uL 206  --  230 289 304 196   NEUTROS ABS Thousands/µL 8 20*  --  9 01*  --  10 10* 6 92         Results from last 7 days   Lab Units 01/19/21  0511 01/18/21  0529 01/17/21  1045 01/13/21  0547   SODIUM mmol/L 146* 143 146* 140   POTASSIUM mmol/L 4 2 3 8 3 9 4 0   CHLORIDE mmol/L 114* 108 109* 108   CO2 mmol/L 31 34* 31 27   ANION GAP mmol/L 1* 1* 6 5   BUN mg/dL 23 23 24 14   CREATININE mg/dL 1 58* 1 66* 1 88* 1 30   EGFR ml/min/1 73sq m 42 39 34 53   CALCIUM mg/dL 7 7* 8 0* 9 2 7 8*     Results from last 7 days   Lab Units 01/17/21  1045   AST U/L 24   ALT U/L 52   ALK PHOS U/L 87   TOTAL PROTEIN g/dL 7 8   ALBUMIN g/dL 3 1*   TOTAL BILIRUBIN mg/dL 0 39         Results from last 7 days   Lab Units 01/19/21  0511 01/18/21  0529 01/17/21  1045 01/13/21  0547   GLUCOSE RANDOM mg/dL 136 90 116 119     Results from last 7 days   Lab Units 01/17/21  1045   LACTIC ACID mmol/L 1 3     Results from last 7 days   Lab Units 01/17/21  1045   LIPASE u/L 496*     ED Treatment:   Medication Administration from 01/17/2021 0936 to 01/17/2021 1455    Date/Time Order Dose Route Action   01/17/2021 1044 sodium chloride 0 9 % bolus 1,000 mL 1,000 mL Intravenous New Bag   01/17/2021 1048 ondansetron (ZOFRAN) injection 4 mg 4 mg Intravenous Given   01/17/2021 1126 iohexol (OMNIPAQUE) 350 MG/ML injection (MULTI-DOSE) 100 mL 100 mL Intravenous Given   01/17/2021 1424 lactated ringers infusion 125 mL/hr Intravenous New Bag   01/17/2021 1426 heparin (porcine) subcutaneous injection 5,000 Units 5,000 Units Subcutaneous Given        Past Medical History:   Diagnosis Date    GERD (gastroesophageal reflux disease)     Hernia, internal 01/10/2021    Hypertension      Present on Admission:   Hiatal hernia   Lang's esophagus without dysplasia   Duodenal nodule    Admitting Diagnosis: Hiatal hernia [K44 9]  Hernia, hiatal [K44 9]  Abdominal pain [R10 9]  Nausea & vomiting [R11 2]     Age/Sex: 66 y o  male     Admission Orders:    Scheduled Medications:  amLODIPine, 5 mg, Oral, Daily  heparin (porcine), 5,000 Units, Subcutaneous, Q8H MCKENZIE  iohexol, 50 mL, Oral, 90 min pre-procedure  lisinopril, 10 mg, Oral, Daily  pantoprazole, 40 mg, Intravenous, Q12H MCKENZIE      Continuous IV Infusions:     PRN Meds:  HYDROmorphone, 0 5 mg, Intravenous, Q4H PRN  HYDROmorphone, 0 5 mg, Intravenous, Q3H PRN  Labetalol HCl, 10 mg, Intravenous, Q6H PRN  morphine injection, 2 mg, Intravenous, Q4H PRN  ondansetron, 4 mg, Intravenous, Q6H PRN  phenol, 2 spray, Mouth/Throat, Q2H PRN    SCDs  NPO   NGT to LCWS  Up as mitch  OR 1/18    Network Utilization Review Department  ATTENTION: Please call with any questions or concerns to [phone] and carefully listen to the prompts so that you are directed to the right person  All voicemails are confidential   Dain Miller all requests for admission clinical reviews, approved or denied determinations and any other requests to dedicated fax number below belonging to the campus where the patient is receiving treatment   List of dedicated fax numbers for the Facilities:  [address] DENIALS (Administrative/Medical Necessity) [phone]   [address] (Maternity/NICU/Pediatrics) [phone]   [address] Lexie Castellano [address] (  [address]) [address] [place] [address] [address] [place] [address] [place] [address] [phone]

## 2021-01-19 NOTE — OP NOTE
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    Procedure(s) (LRB):  ESOPHAGOGASTRODUODENOSCOPY (EGD) (N/A)  LAPAROSCOPIC REPAIR OF PARAESOPHAGEAL HERNIA WITH MESH  PARTIAL FUNDOPLICATION    Specimen(s):  ID Type Source Tests Collected by Time Destination   1 : Para esophageal hernia sac  Tissue Esophagus TISSUE EXAM Antonia Colón MD 2021 191        Estimated Blood Loss:   Minimal    Drains:  NG/OG/Enteral Tube Nasogastric Right nares (Active)   Placement Reverification Auscultation 21   Site Assessment Clean;Dry; Intact 21   External Tube Length (cm) 39 cm 21   Status Suction-low continuous 21   Drainage Appearance Isidore Pitts; Coffee ground 21   Output (mL) 750 mL 21 1408   Number of days: 1       NG/OG/Enteral Tube (Active)   Number of days: 0       [REMOVED] Urethral Catheter Latex 16 Fr  (Removed)   Number of days: 0       Anesthesia Type:   General    Operative Indications:  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  Mr Eleazar Bell is a 25-year-old who is now presented twice in the last 2 weeks with gastric outlet obstruction related to a giant type 3 paraesophageal hernia  His pain resolved with placement of a nasogastric tube and is brought to the operating room now for repair      Operative Findings:  Giant type 3 paraesophageal hernia    Complications:   None    Procedure and Technique:  The patient was brought to the operating room placed in the supine position  After institution of adequate general anesthesia the patient was placed in the modified lithotomy position with his thighs bolstered to allow for reverse Trendelenburg  His entire abdomen was prepped and draped into a sterile field  The abdomen was insufflated with a Veress needle at ram's point  A 10 mm port was then placed 3 cm above his umbilicus and just off to the left using a Visiport technique  A 5 mm surgeons assistant port was placed in the left lateral abdomen  A Mona liver retractor was utilized  Two 10 mm ports were placed 1 in the left upper quadrant and the other in the midline for the surgeon's right left hand  The patient was placed in reverse Trendelenburg  There is a large hiatal hernia containing nearly the entire stomach as well as some omental fat  The adipose tissue can be reduced as can some of the stomach but there are still a large amount of stomach that will not reduce  The plane between the crura and the hernia sac is developed and that avascular plane dissected  This dissection carries down the left joie as far as possible and then across the top in onto the right joie towards its base  Downward traction on the hernia sac allows for dissection to reduce more and more of it  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 of the hernia  It takes quite some time to get this reduced  With the stomach now within the abdomen, it is inspected and there was no signs of significant ischemia  The fundus is then freed by dividing the short gastric vessels using a Harmonic  This dissection is carried up onto the left joie and down to its base  The esophagus is then encircled with a Penrose drain  The esophagus is dissected for approximately 8-9 cm into the chest   Both the left and right vagus nerves were visualized and protected  Part of the mediastinal lipoma needs to be resected  Part of the hernia sac is also resected in order to clearly visualize the GE junction and the angle of Hiss which appears in the abdomen  Intraoperative endoscopy is performed and this demonstrates the Z-line is within the abdomen  The stomach is insufflated and there was no evidence of gastric injury  The esophageal hiatus is then recounted braided using 3 interrupted horizontal, pledgeted, 0 Ethibond sutures posteriorly  The crural repair was then buttressed with [place] BioA mesh  The fundus is then wrapped posterior to the stomach and a partial fundoplication of the Toupet variety is performed  Hemostasis is excellent  The liver retractor was removed  The mediastinal lipoma and hernia sac a removed from the body inside of a protective bag  The 10 mm ports were then repaired using a Cope suture Passer and 0 Vicryl under direct vision  The abdomen is then desufflated and skin incisions are closed with running absorbable suture and skin glue  The patient is then extubated and transferred to the recovery unit in stable condition having tolerated the procedure well  Sponge and instrument counts were correct     I was present for the entire procedure    Patient Disposition:  PACU     SIGNATURE: Mauro Merlin, MD  DATE: January 18, 2021  TIME: 7:47 PM

## 2021-01-19 NOTE — CASE MANAGEMENT
LOS 22 hours    NO bundle No readmit    Pt lives with his wife in two story house  Independent PTA  He uses NO DME  He has cane and walker at home  Pt uses [place] pharmacy or [facility] in [place]  Wife will transport home  Copy of LW requested  Denies Drug or MH issues  No Hx of VNA or STr  CM reviewed d/c planning process including the following: identifying help at home, patient preference for d/c planning needs, Discharge Lounge, [facility] Meds to Bed program, availability of treatment team to discuss questions or concerns patient and/or family may have regarding understanding medications and recognizing signs and symptoms once discharged  CM also encouraged patient to follow up with all recommended appointments after discharge  Patient advised of importance for patient and family to participate in managing patients medical well being

## 2021-01-19 NOTE — PLAN OF CARE
Problem: PAIN - ADULT  Goal: Verbalizes/displays adequate comfort level or baseline comfort level  Description: Interventions:  - Encourage patient to monitor pain and request assistance  - Assess pain using appropriate pain scale  - Administer analgesics based on type and severity of pain and evaluate response  - Implement non-pharmacological measures as appropriate and evaluate response  - Consider cultural and social influences on pain and pain management  - Notify physician/advanced practitioner if interventions unsuccessful or patient reports new pain  Outcome: Progressing     Problem: INFECTION - ADULT  Goal: Absence or prevention of progression during hospitalization  Description: INTERVENTIONS:  - Assess and monitor for signs and symptoms of infection  - Monitor lab/diagnostic results  - Monitor all insertion sites, i e  indwelling lines, tubes, and drains  - Monitor endotracheal if appropriate and nasal secretions for changes in amount and color  - [place] appropriate cooling/warming therapies per order  - Administer medications as ordered  - Instruct and encourage patient and family to use good hand hygiene technique  - Identify and instruct in appropriate isolation precautions for identified infection/condition  Outcome: Progressing  Goal: Absence of fever/infection during neutropenic period  Description: INTERVENTIONS:  - Monitor WBC    Outcome: Progressing     Problem: SAFETY ADULT  Goal: Patient will remain free of falls  Description: INTERVENTIONS:  - Assess patient frequently for physical needs  -  Identify cognitive and physical deficits and behaviors that affect risk of falls    -  [place] fall precautions as indicated by assessment   - Educate patient/family on patient safety including physical limitations  - Instruct patient to call for assistance with activity based on assessment  - Modify environment to reduce risk of injury  - Consider OT/PT consult to assist with strengthening/mobility  Outcome: Progressing  Goal: Maintain or return to baseline ADL function  Description: INTERVENTIONS:  -  Assess patient's ability to carry out ADLs; assess patient's baseline for ADL function and identify physical deficits which impact ability to perform ADLs (bathing, care of mouth/teeth, toileting, grooming, dressing, etc )  - Assess/evaluate cause of self-care deficits   - Assess range of motion  - Assess patient's mobility; develop plan if impaired  - Assess patient's need for assistive devices and provide as appropriate  - Encourage maximum independence but intervene and supervise when necessary  - Involve family in performance of ADLs  - Assess for home care needs following discharge   - Consider OT consult to assist with ADL evaluation and planning for discharge  - Provide patient education as appropriate  Outcome: Progressing  Goal: Maintain or return mobility status to optimal level  Description: INTERVENTIONS:  - Assess patient's baseline mobility status (ambulation, transfers, stairs, etc )    - Identify cognitive and physical deficits and behaviors that affect mobility  - Identify mobility aids required to assist with transfers and/or ambulation (gait belt, sit-to-stand, lift, walker, cane, etc )  - [place] fall precautions as indicated by assessment  - Record patient progress and toleration of activity level on Mobility SBAR; progress patient to next Phase/Stage  - Instruct patient to call for assistance with activity based on assessment  - Consider rehabilitation consult to assist with strengthening/weightbearing, etc   Outcome: Progressing     Problem: DISCHARGE PLANNING  Goal: Discharge to home or other facility with appropriate resources  Description: INTERVENTIONS:  - Identify barriers to discharge w/patient and caregiver  - Arrange for needed discharge resources and transportation as appropriate  - Identify discharge learning needs (meds, wound care, etc )  - Arrange for interpretive services to assist at discharge as needed  - Refer to Case Management Department for coordinating discharge planning if the patient needs post-hospital services based on physician/advanced practitioner order or complex needs related to functional status, cognitive ability, or social support system  Outcome: Progressing     Problem: Knowledge Deficit  Goal: Patient/family/caregiver demonstrates understanding of disease process, treatment plan, medications, and discharge instructions  Description: Complete learning assessment and assess knowledge base  Interventions:  - Provide teaching at level of understanding  - Provide teaching via preferred learning methods  Outcome: Progressing     Problem: Prexisting or High Potential for Compromised Skin Integrity  Goal: Skin integrity is maintained or improved  Description: INTERVENTIONS:  - Identify patients at risk for skin breakdown  - Assess and monitor skin integrity  - Assess and monitor nutrition and hydration status  - Monitor labs   - Assess for incontinence   - Turn and reposition patient  - Assist with mobility/ambulation  - Relieve pressure over bony prominences  - Avoid friction and shearing  - Provide appropriate hygiene as needed including keeping skin clean and dry  - Evaluate need for skin moisturizer/barrier cream  - Collaborate with interdisciplinary team   - Patient/family teaching  - Consider wound care consult   Outcome: Progressing     Problem: Potential for Falls  Goal: Patient will remain free of falls  Description: INTERVENTIONS:  - Assess patient frequently for physical needs  -  Identify cognitive and physical deficits and behaviors that affect risk of falls    -  [place] fall precautions as indicated by assessment   - Educate patient/family on patient safety including physical limitations  - Instruct patient to call for assistance with activity based on assessment  - Modify environment to reduce risk of injury  - Consider OT/PT consult to assist with strengthening/mobility  Outcome: Progressing

## 2021-01-20 LAB
ANION GAP SERPL CALCULATED.3IONS-SCNC: 3 MMOL/L (ref 4–13)
BUN SERPL-MCNC: 19 MG/DL (ref 5–25)
CALCIUM SERPL-MCNC: 7.8 MG/DL (ref 8.3–10.1)
CHLORIDE SERPL-SCNC: 110 MMOL/L (ref 100–108)
CO2 SERPL-SCNC: 30 MMOL/L (ref 21–32)
CREAT SERPL-MCNC: 1.5 MG/DL (ref 0.6–1.3)
ERYTHROCYTE [DISTWIDTH] IN BLOOD BY AUTOMATED COUNT: 14.8 % (ref 11.6–15.1)
GFR SERPL CREATININE-BSD FRML MDRD: 44 ML/MIN/1.73SQ M
GLUCOSE SERPL-MCNC: 103 MG/DL (ref 65–140)
HCT VFR BLD AUTO: 31.3 % (ref 36.5–49.3)
HGB BLD-MCNC: 9.8 G/DL (ref 12–17)
MCH RBC QN AUTO: 29.9 PG (ref 26.8–34.3)
MCHC RBC AUTO-ENTMCNC: 31.3 G/DL (ref 31.4–37.4)
MCV RBC AUTO: 95 FL (ref 82–98)
PLATELET # BLD AUTO: 264 THOUSANDS/UL (ref 149–390)
PMV BLD AUTO: 11.5 FL (ref 8.9–12.7)
POTASSIUM SERPL-SCNC: 4 MMOL/L (ref 3.5–5.3)
RBC # BLD AUTO: 3.28 MILLION/UL (ref 3.88–5.62)
SODIUM SERPL-SCNC: 143 MMOL/L (ref 136–145)
WBC # BLD AUTO: 11.18 THOUSAND/UL (ref 4.31–10.16)

## 2021-01-20 PROCEDURE — 85027 COMPLETE CBC AUTOMATED: CPT | Performed by: SURGERY

## 2021-01-20 PROCEDURE — C9113 INJ PANTOPRAZOLE SODIUM, VIA: HCPCS | Performed by: SURGERY

## 2021-01-20 PROCEDURE — 97166 OT EVAL MOD COMPLEX 45 MIN: CPT

## 2021-01-20 PROCEDURE — 80048 BASIC METABOLIC PNL TOTAL CA: CPT | Performed by: SURGERY

## 2021-01-20 PROCEDURE — 97162 PT EVAL MOD COMPLEX 30 MIN: CPT

## 2021-01-20 RX ORDER — SENNOSIDES 8.6 MG
1 TABLET ORAL
Status: DISCONTINUED | OUTPATIENT
Start: 2021-01-20 | End: 2021-01-21 | Stop reason: HOSPADM

## 2021-01-20 RX ORDER — POLYETHYLENE GLYCOL 3350 17 G/17G
17 POWDER, FOR SOLUTION ORAL DAILY
Status: DISCONTINUED | OUTPATIENT
Start: 2021-01-20 | End: 2021-01-21 | Stop reason: HOSPADM

## 2021-01-20 RX ORDER — DOCUSATE SODIUM 100 MG/1
100 CAPSULE, LIQUID FILLED ORAL 2 TIMES DAILY
Status: DISCONTINUED | OUTPATIENT
Start: 2021-01-20 | End: 2021-01-21 | Stop reason: HOSPADM

## 2021-01-20 RX ADMIN — PANTOPRAZOLE SODIUM 40 MG: 40 INJECTION, POWDER, FOR SOLUTION INTRAVENOUS at 21:37

## 2021-01-20 RX ADMIN — HEPARIN SODIUM 5000 UNITS: 5000 INJECTION INTRAVENOUS; SUBCUTANEOUS at 21:37

## 2021-01-20 RX ADMIN — HEPARIN SODIUM 5000 UNITS: 5000 INJECTION INTRAVENOUS; SUBCUTANEOUS at 05:22

## 2021-01-20 RX ADMIN — PANTOPRAZOLE SODIUM 40 MG: 40 INJECTION, POWDER, FOR SOLUTION INTRAVENOUS at 08:58

## 2021-01-20 RX ADMIN — HEPARIN SODIUM 5000 UNITS: 5000 INJECTION INTRAVENOUS; SUBCUTANEOUS at 13:58

## 2021-01-20 RX ADMIN — AMLODIPINE BESYLATE 5 MG: 5 TABLET ORAL at 08:58

## 2021-01-20 RX ADMIN — LISINOPRIL 10 MG: 10 TABLET ORAL at 08:58

## 2021-01-20 NOTE — PROGRESS NOTES
Progress Note - Thoracic Surgery   Davin Liriano 66 y o  male MRN: [id number]  Unit/Bed#: [facility] 824-01 Encounter: [id number]    Assessment:  64yo M who presented with giant incarcerated paraesophageal hernia with obstruction, now s/p urgent laparoscopic PEHR with mesh and partial fundoplication (5/00/12)  Barium swallow study on POD#1 showed free passage of contrast into stomach and no esophageal leak  Plan:   Continue full liquid diet   Wean supplemental oxygen as able   Incentive spirometry   Aggressive pulmonary toilet   OOBTC, AAT   PT/OT   DVT PPx    Subjective/Objective     Subjective:   No acute events overnight  States he feels much better this morning  Tolerating full liquids without issue  Passing small amount of flatus, no BM  Patient instructed on IS use this morning -- currently only pulling ~250 cc upon demonstration  Objective:    Blood pressure 143/83, pulse 97, temperature 98 5 °F (36 9 °C), resp  rate 20, height 5' 11" (1 803 m), weight 81 3 kg (179 lb 3 7 oz), SpO2 94 %  ,Body mass index is 25 kg/m²        Intake/Output Summary (Last 24 hours) at 1/20/2021 0616  Last data filed at 1/20/2021 8114  Gross per 24 hour   Intake 320 ml   Output 1325 ml   Net -1005 ml       Invasive Devices     Peripheral Intravenous Line            Peripheral IV 01/17/21 Right Forearm 2 days    Peripheral IV 01/18/21 Left Hand 1 day                Physical Exam:   Gen:  NAD  CV:  warm, well-perfused  Lungs: nl effort on 2L NC  Abd:  soft, NT, mildly distended, incisions C/D/I   Ext:  no CCE  Neuro: A&Ox3     Results from last 7 days   Lab Units 01/20/21  0528 01/19/21  0511 01/18/21  1044 01/18/21  0529   WBC Thousand/uL 11 18* 9 41  --  11 58*   HEMOGLOBIN g/dL 9 8* 9 0* 11 1* 10 1*   HEMATOCRIT % 31 3* 28 4*  --  32 1*   PLATELETS Thousands/uL 264 206  --  230     Results from last 7 days   Lab Units 01/19/21  0511 01/18/21  0529 01/17/21  1045   POTASSIUM mmol/L 4 2 3 8 3 9   CHLORIDE mmol/L 114* 108 109* CO2 mmol/L 31 34* 31   BUN mg/dL 23 23 24   CREATININE mg/dL 1 58* 1 66* 1 88*   CALCIUM mg/dL 7 7* 8 0* 9 2     Results from last 7 days   Lab Units 01/18/21  1044   INR  1 06   PTT seconds 29

## 2021-01-20 NOTE — PHYSICAL THERAPY NOTE
Physical Therapy Evaluation    Patient's Name: Yolanda Duarte    Admitting Diagnosis  Hiatal hernia [K44 9]  Hernia, hiatal [K44 9]  Abdominal pain [R10 9]  Nausea & vomiting [R11 2]    Problem List  Patient Active Problem List   Diagnosis    Duodenal nodule    Lang's esophagus without dysplasia    Hiatal hernia       Past Medical History  Past Medical History:   Diagnosis Date    GERD (gastroesophageal reflux disease)     Hernia, internal 01/10/2021    Hypertension        Past Surgical History  Past Surgical History:   Procedure Laterality Date    ESOPHAGOGASTRODUODENOSCOPY N/A 10/18/2018    Procedure: ESOPHAGOGASTRODUODENOSCOPY (EGD); Surgeon: Swati Huertas MD;  Location: [facility]; Service: Gastroenterology    ESOPHAGOGASTRODUODENOSCOPY N/A 1/18/2021    Procedure: ESOPHAGOGASTRODUODENOSCOPY (EGD); Surgeon: Aavni Pedroza MD;  Location: [facility];  Service: Thoracic    NO PAST SURGERIES      PARAESOPHAGEAL HERNIA REPAIR N/A 1/18/2021    Procedure: REPAIR HERNIA PARAESOPHAGEAL  LAPAROSCOPIC;  Surgeon: Avani Pedroza MD;  Location: [facility];  Service: Thoracic    IA COLONOSCOPY FLX DX W/COLLJ SPEC WHEN PFRMD N/A 11/21/2017    Procedure: EGD AND COLONOSCOPY;  Surgeon: Brice Gaming MD;  [facility]: [facility]; Service: Gastroenterology    IA EDG US EXAM SURGICAL ALTER STOM DUODENUM/JEJUNUM N/A 10/18/2018    Procedure: LINEAR ENDOSCOPIC U/S;  Surgeon: Swati Huertas MD;  Location: [facility]; Service: Gastroenterology        01/20/21 0951   PT Last Visit   PT Visit Date 01/20/21   Note Type   Note type Evaluation   Pain Assessment   Pain Assessment Tool 0-10   Pain Score 3   Pain Location/Orientation Location: Abdomen;Orientation: Mid   Hospital Pain Intervention(s) Repositioned; Ambulation/increased activity   Home Living   Type of [address] [facility] Two level;Stairs to enter with rails  (2 HERMAN)   Prior Function   Level of [place] Independent with ADLs and functional mobility Lives With Spouse   Vocational Retired   Comments Pt reports no AD PTA, states he enjoys wood working and metal working   Restrictions/Precautions   [place] Bearing Precautions Per Order No   Other Precautions Pain   General   Family/Caregiver Present No   Cognition   Overall Cognitive Status WFL   Arousal/Participation Alert   Orientation Level Oriented X4   Memory Within functional limits   Following Commands Follows all commands and directions without difficulty   Comments Pt very pleasant and cooperative   RLE Assessment   RLE Assessment WNL  (5/5)   LLE Assessment   LLE Assessment WNL  (5/5)   Light Touch   RLE Light Touch Grossly intact   LLE Light Touch Grossly intact   Bed Mobility   Additional Comments Pt sitting up in chair upon PT arrival   Transfers   Sit to Stand 7  Independent   Stand to Sit 7  Independent   Additional Comments no AD   Ambulation/Elevation   Gait pattern Foward flexed; Short stride   Gait Assistance 7  Independent   Assistive Device None   Distance 200 ft   Stair Management Assistance 6  Modified independent   Stair Management Technique One rail R;Reciprocal   Number of Stairs 7   Balance   Static Sitting Normal   Dynamic Sitting Good   Static Standing Good   Dynamic Standing Good   Ambulatory Fair +   Activity Tolerance   Activity Tolerance Patient tolerated treatment well  (HR max 110 bpm, mild COELHO, O2 sats 91-95% RA)   Medical Staff Made Aware OT, Liset   Nurse Made Aware RN updated   Assessment   Assessment Pt is a 66 y o  male seen for PT evaluation s/p admit to [facility] on 1/17/2021  Pt was admitted with a primary dx of: incarcerated and obstructed paraesophogeal hernia s/p laparoscopic repair of hernia with mesh on 1/18  PT now consulted for assessment of mobility and d/c needs  Pt with Up as tolerated orders  Pts current comorbidities effecting treatment include: HTN    Personal factors effecting treatment include: stairs to enter home, detatched garage 100 yds away from home where he enjoys hobbies  Pts current clinical presentation is Evolving (medium complexity) due to Ongoing medical management for primary dx, Trending lab values, Continuous pulse oximetry monitoring , COELHO, tachycardic with ambulation  Prior to admission, pt was independent with all mobility  Upon evaluation, pt currently is independent with transfers,  ambulation 200 ft w/ no AD and stair climb 7 steps with 1 HR  Pt with no acute PT needs, denies any questions/concerns regarding discharge home  Pt encouraged to ambulation 3x/day during hospital stay to maintain activity tolerance, verbalized understanding  At conclusion of PT session pt returned back in chair with phone and call bell within reach  Pt denies any further questions at this time  Recommend home with family care upon hospital D/C     Goals   Patient Goals to go home   Plan   PT Frequency One time visit   Recommendation   PT Discharge Recommendation Return to previous environment with social support  (home with family care)   PT - OK to Discharge Yes   AM-PAC Basic Mobility Inpatient   Turning in Bed Without Bedrails 4   Lying on Back to Sitting on Edge of Flat Bed 4   Moving Bed to Chair 4   Standing Up From Chair 4   Walk in Room 4   Climb 3-5 Stairs 4   Basic Mobility Inpatient Raw Score 24   Basic Mobility Standardized Score 57 68       Sekou Russell, PT, DPT

## 2021-01-20 NOTE — OCCUPATIONAL THERAPY NOTE
Occupational Therapy Evaluation     Patient Name: Jeff Mckenzie  RSCGY'J Date: 1/20/2021  Problem List  Principal Problem:    Hiatal hernia  Active Problems:    Duodenal nodule    Lang's esophagus without dysplasia    Past Medical History  Past Medical History:   Diagnosis Date    GERD (gastroesophageal reflux disease)     Hernia, internal 01/10/2021    Hypertension      Past Surgical History  Past Surgical History:   Procedure Laterality Date    ESOPHAGOGASTRODUODENOSCOPY N/A 10/18/2018    Procedure: ESOPHAGOGASTRODUODENOSCOPY (EGD); Surgeon: Joana Tang MD;  [facility]; Service: Gastroenterology    ESOPHAGOGASTRODUODENOSCOPY N/A 1/18/2021    Procedure: ESOPHAGOGASTRODUODENOSCOPY (EGD); Surgeon: Reyes Bro MD;  [facility];  Service: Thoracic    NO PAST SURGERIES      PARAESOPHAGEAL HERNIA REPAIR N/A 1/18/2021    Procedure: REPAIR HERNIA PARAESOPHAGEAL  LAPAROSCOPIC;  Surgeon: Reyes Bro MD;  [facility];  Service: Thoracic    MA COLONOSCOPY FLX DX W/COLLJ SPEC WHEN PFRMD N/A 11/21/2017    Procedure: EGD AND COLONOSCOPY;  Surgeon: Jeronimo Ball MD;  [facility]; Service: Gastroenterology    MA EDG US EXAM SURGICAL ALTER STOM DUODENUM/JEJUNUM N/A 10/18/2018    Procedure: LINEAR ENDOSCOPIC U/S;  Surgeon: Joana Tang MD;  [facility]; Service: Gastroenterology        01/20/21 0940   OT Last Visit   OT Visit Date 01/20/21   Note Type   Note type Evaluation   Restrictions/Precautions   Weight Bearing Precautions Per Order No   Pain Assessment   Pain Assessment Tool 0-10   Pain Score 3   Pain Location/Orientation Location: Abdomen   Hospital Pain Intervention(s) Repositioned; Ambulation/increased activity; Elevated; Emotional support; Rest   Home Living   Type of [address] [place] [address] Two level  (2STE with rails)   Bathroom Shower/Tub Walk-in shower   Bathroom Toilet Standard   Bathroom Equipment   (no DME at baseline)   Bathroom Accessibility Accessible   Home Equipment Walker;Cane   Prior Function   Level of [place] Independent with ADLs and functional mobility   Lives With Spouse   Receives Help From Family   ADL Assistance Independent   IADLs Independent   Falls in the last 6 months 0   Vocational Retired   Lifestyle   Autonomy I with ADL's/IADL's, no AD with functional, +drives, retired   Reciprocal Relationships supportive spouse is retired and can assist pt during recovery as needed   Service to Others retired   Intrinsic Gratification metal/wood working   Psychosocial   Psychosocial ([address]) [address]  7  Independent   Grooming Assistance 7  [address] [facility] 5  [address]  [place] [address]  [address] 5  Supervision/Setup  (forward functional reach, educated pt on compensatory techniques to decrease pain)   LB Dressing Deficit Setup;Don/doff R sock; Don/doff L sock   Toileting Assistance  5  Supervision/Setup   Bed Mobility   Additional Comments pt OOB when recieved, left in chair with all needs met   Transfers   Sit to Stand 7  Independent   Stand to Sit 7  Independent   Toilet transfer 5  Supervision   Additional items Standard toilet  (educated pt on safe transfer technique vs using door handle -pt minimally receptive)   Additional Comments (-) AD   Functional Mobility   Functional Mobility 5  Supervision   Additional Comments S without AD, no LOB/COELHO noted with household distance functional mobility, educated pt educated on home safety to decrease fall risk   Additional items   (no AD)   Balance   Static Sitting Normal   Dynamic Sitting Good   Static Standing Fair +   Dynamic Standing Fair   Ambulatory Fair   Activity Tolerance   Activity Tolerance Patient tolerated treatment well   Medical Staff Made Aware PT Erinn Spicer Nurse Made Aware RN cleared pt for therapy   RUE Assessment   RUE Assessment WFL   LUE Assessment   LUE Assessment WFL   Hand Function   Gross Motor Coordination Functional   Fine Motor Coordination Functional   Cognition   Arousal/Participation Alert; Responsive;Arousable   Attention Within functional limits   Orientation Level Oriented X4   Memory Within functional limits   Following Commands Follows all commands and directions without difficulty   Comments pt agreeable therapy, occassional safety cue for impaired judgement/reasoning -suspect baseline cognition  Assessment   Limitation Decreased Safe judgement during ADL;Decreased high-level ADLs   Prognosis Good   Assessment Pt is a 66 y o  male who was [facility] to [facility] on 1/17/2021 with gaston's esophagus without dysplasia, duodenal nodule Hiatal hernia s/p laparoscopic repair paraesophageal hernia with Mesh  On 1/18   Pt's problem list also includes PMH of GERD, HTN, hernia  At baseline pt was completing I with ADl's/IADL's, no AD with functional ambulation, +drives  Pt lives with spouse in a [facility] with 2STE  Currently pt requires S/set-up for overall ADLS and I/S without AD for functional mobility/transfers  Pt currently presents with impairments in the following categories -steps to enter environment, difficulty performing IADLS  and compliance activity tolerance  These impairments, as well as pt's pain  limit pt's ability to safely engage in all baseline areas of occupation, includingcommunity mobility, laundry , driving, house maintenance, medication management, meal prep, cleaning, social participation  and leisure activities  From OT standpoint, recommend return home with family support upon D/C   No further acute OT needs indicated at this time - Recommend continued oob for meals, ambulation to/from BR, setup for self care tasks and mobility in hallway with nursing/restorative - d/c from caseload with above recommendations   Goals Patient Goals go home   Recommendation   OT Discharge Recommendation Return to previous environment with social support   Equipment Recommended   (pt declined need for DME at discharge)   OT - OK to Discharge No   Barthel Index   Feeding 10   Bathing 0   Grooming Score 5   Dressing Score 10   Bladder Score 10   Bowels Score 10   Toilet Use Score 10   Transfers (Bed/Chair) Score 10   Mobility (Level Surface) Score 10   Stairs Score 10   Barthel Index Score 85   Modified Christiana Scale   Modified Di Scale 2      Lisandra Valencia MOT, OTR/L

## 2021-01-21 VITALS
DIASTOLIC BLOOD PRESSURE: 90 MMHG | OXYGEN SATURATION: 91 % | WEIGHT: 179.23 LBS | SYSTOLIC BLOOD PRESSURE: 148 MMHG | HEART RATE: 104 BPM | TEMPERATURE: 98.3 F | HEIGHT: 71 IN | RESPIRATION RATE: 16 BRPM | BODY MASS INDEX: 25.09 KG/M2

## 2021-01-21 PROCEDURE — C9113 INJ PANTOPRAZOLE SODIUM, VIA: HCPCS | Performed by: SURGERY

## 2021-01-21 RX ORDER — ACETAMINOPHEN 325 MG/1
650 TABLET ORAL EVERY 6 HOURS PRN
Qty: 30 TABLET | Refills: 0 | Status: SHIPPED | OUTPATIENT
Start: 2021-01-21

## 2021-01-21 RX ORDER — DOCUSATE SODIUM 100 MG/1
100 CAPSULE, LIQUID FILLED ORAL 2 TIMES DAILY PRN
Qty: 10 CAPSULE | Refills: 0 | Status: SHIPPED | OUTPATIENT
Start: 2021-01-21

## 2021-01-21 RX ORDER — OXYCODONE HYDROCHLORIDE 5 MG/1
5 TABLET ORAL EVERY 4 HOURS PRN
Qty: 30 TABLET | Refills: 0 | Status: SHIPPED | OUTPATIENT
Start: 2021-01-21 | End: 2021-01-31

## 2021-01-21 RX ADMIN — LISINOPRIL 10 MG: 10 TABLET ORAL at 08:37

## 2021-01-21 RX ADMIN — DOCUSATE SODIUM 100 MG: 100 CAPSULE, LIQUID FILLED ORAL at 08:37

## 2021-01-21 RX ADMIN — AMLODIPINE BESYLATE 5 MG: 5 TABLET ORAL at 08:37

## 2021-01-21 RX ADMIN — PANTOPRAZOLE SODIUM 40 MG: 40 INJECTION, POWDER, FOR SOLUTION INTRAVENOUS at 08:37

## 2021-01-21 RX ADMIN — HEPARIN SODIUM 5000 UNITS: 5000 INJECTION INTRAVENOUS; SUBCUTANEOUS at 05:30

## 2021-01-21 NOTE — DISCHARGE INSTRUCTIONS
No alcohol or carbonated drinks for the first 4 weeks  Eat small, frequent meals  We recommend 6 meals a day (every 2-3 hours)  Take small bites, chew food well before swallowing  Avoid foods that cause stomach gas and distention: corn, beans, broccoli, peas, onions, cabbage, cauliflower, and lentils  Avoid breads, tough meats, tomato and citrus based foods, fried, spicy, nuts, seeds, and raw vegetables  Sit upright during meals and for 4 hours following every meal    Continue to take your anti-reflux medications as prescribed  Gently wash incisions with soap and water  Do not apply any creams/lotions/ or ointments  Do not soak incisions  You may shower  Maintain Full liquid diet for 4-5 days following discharge  Then, advance to soft foods as tolerated until your follow up appointment in 2 weeks

## 2021-01-21 NOTE — UTILIZATION REVIEW
Notification of Inpatient Admission/Inpatient Authorization Request   This is a Notification of Inpatient Admission for 5 Neftali Case  Be advised that this patient was admitted to our facility under Inpatient Status  Contact Renita Maria at [phone] for additional admission information  Anderson Montoya  DEPT  DEDICATED -[phone]  Patient Name:   Antoni Queen   YOB: 1945       [address]   [address]:   [facility]  Tax ID: [id number]  NPI: [id number] Attending Provider/NPI:  Phone:  Address: S[place] Hurt, [address]   [phone]  Same as ARTHUR/Maira Guzman 1106 of Service Code: 24 Place of Service Name:  [address] [place] [address]   Start Date: 1/17/21 1330 Discharge Date & Time: 1/21/2021  9:17 AM    Type of Admission: Inpatient Status Discharge Disposition (if discharged): Home/Self Care   Patient Diagnoses: Hiatal hernia [K44 9]  Hernia, hiatal [K44 9]  Abdominal pain [R10 9]  Nausea & vomiting [R11 2]     Orders: Admission Orders (From admission, onward)     Ordered        01/17/21 1331  INPATIENT ADMISSION  Once                    Assigned Utilization Review Contact: Renita Maria  Utilization ,   Network Utilization Review Department  Phone: [phone]; Fax [phone]   Email: [email or web address]   ATTENTION PAYERS: Please call the assigned Utilization  directly with any questions or concerns ALL voicemails in the department are confidential  Send all requests for admission clinical reviews, approved or denied determinations and any other requests to dedicated fax number belonging to the campus where the patient is receiving treatment    Initial Clinical Review    Admission: Date/Time/Statement:   Admission Orders (From admission, onward)     Ordered        01/17/21 1331  INPATIENT ADMISSION  Once                   Orders Placed This Encounter   Procedures    INPATIENT ADMISSION     Standing Status:   Standing     Number of Occurrences:   1     Order Specific Question:   Level of Care     Answer:   Med Surg [16]     Order Specific Question:   Estimated length of stay     Answer:   More than 2 Midnights     Order Specific Question:   Certification     Answer:   I certify that inpatient services are medically necessary for this patient for a duration of greater than two midnights  See H&P and MD Progress Notes for additional information about the patient's course of treatment  ED Arrival Information     Expected Arrival Acuity Means of Arrival Escorted By Service Admission Type    - 1/17/2021 09:36 Emergent Walk-In Self Thoracic Surgery Emergency    Arrival Complaint    hernia        Chief Complaint   Patient presents with   Araceli Gonzales told pt to come to ED today as he is still have many issues with his hernia, pt recently [facility] after many tests and evaluation  pt arrives today with hiccups, discomfort in abd some nausea and vomiting, no blood     Assessment/Plan:   Mr Josue Molina is a 65 yo male who presents to the ED at the direction of surgeon with c/o less oral intake tolerance with abd distention, pain with N/V and burping  Pt has  known giant paraesophageal hernia and was recently d/c from hospital for same  One week ago he had obstruction but not gangrene, was decompressed with an NG tube and eventually tolerated a soft diet and d/c to home with f/u scheduled for this coming week  He is admitted to INPATIENT status with Incarcerated and obstructed giant paraesophageal hernia, ISAAC on CKD - NPO, NGT, IV fluids, will have surgery 1/18  Symptoms are resolved since NGT was inserted  1/18 for OR today, NGT output is a total of 2700 ml, UO is 550 ml  Protonix IV BID, continue NGT, IV fluids, NPO    Pt is on the schedule for 1330 today       ++++++++++++++++++++++++++++++    OPERATIVE REPORT  PATIENT NAME: Zhane Galeana    SURGERY DATE: 1/18/2021     Preop Diagnosis:  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     Post-Op Diagnosis Codes:  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     Procedure(s) (LRB):  ESOPHAGOGASTRODUODENOSCOPY (EGD) (N/A)  LAPAROSCOPIC REPAIR OF PARAESOPHAGEAL HERNIA WITH MESH  PARTIAL FUNDOPLICATION     Specimen(s):  ID Type Source Tests Collected by Time Destination   1 : Para esophageal hernia sac  Tissue Esophagus TISSUE EXAM Mk Sneed MD 1/18/2021 1911           Estimated Blood Loss:   Minimal       Anesthesia Type:   General        Operative Findings:  Giant type 3 paraesophageal hernia        ED Triage Vitals   Temperature Pulse Respirations Blood Pressure SpO2   01/17/21 1012 01/17/21 1012 01/17/21 1012 01/17/21 1012 01/17/21 1012   98 2 °F (36 8 °C) 98 20 159/95 97 %      Temp Source Heart Rate Source Patient Position - Orthostatic VS BP Location FiO2 (%)   01/17/21 1012 01/17/21 1400 -- 01/17/21 1100 --   Oral Monitor  Left arm       Pain Score       01/17/21 1012       5          Wt Readings from Last 1 Encounters:   01/17/21 81 3 kg (179 lb 3 7 oz)     Additional Vital Signs:   01/18/21 08:02:38  97 8 °F (36 6 °C)  86  20  152/80  104  95 %  --  --  --   01/18/21 0159  --  --  --  --  --  --  32  3 L/min  Nasal cannula   01/17/21 23:26:31  99 2 °F (37 3 °C)  91  15  121/67  85  89 %Abnormal   --  --  --   01/17/21 23:26:10  99 2 °F (37 3 °C)  --  15  121/67  85  --  --  --  --   01/17/21 2039  --  --  --  --  --  --  --  --  None (Room air)   01/17/21 1600  --  --  --  --  --  --  --  --  None (Room air)   01/17/21 1456  98 8 °F (37 1 °C)  105  18  150/98  --  90 %  28  2 L/min  Nasal cannula   01/17/21 1430  --  106Abnormal   20  145/101Abnormal   118  93 %  28  2 L/min  --   01/17/21 1400  --  104  18  118/81  98  94 %  28  2 L/min  Nasal cannula   01/17/21 1230  --  128Abnormal   20  183/98Abnormal 129  95 %  --  --  None (Room air)   01/17/21 1200  --  94  18  160/92  114  94 %  --  --  None (Room air)   01/17/21 1100  --  96  18  155/87  111  95 %  --  --  None (Room air)   01/17/21 1030  --  58  18  152/92  110  98 %  --  --  --     Pertinent Labs/Diagnostic Test Results:     1/17 CT AP - No significant interval change since prior examinations  Redemonstration of marked distention of the stomach with fluid and a large complex paraesophageal type hiatal hernia  Presence of a gastric volvulus is not entirely excluded however does not appear to be clearly present on recent upper GI examination  Surgical team has been consulted as the patient presents with persistent nausea vomiting  Results from last 7 days   Lab Units 01/20/21  0528 01/19/21  0511 01/18/21  1044 01/18/21  0529  01/17/21  1045   WBC Thousand/uL 11 18* 9 41  --  11 58*  --  13 03*   HEMOGLOBIN g/dL 9 8* 9 0* 11 1* 10 1*  --  13 1   HEMATOCRIT % 31 3* 28 4*  --  32 1*  --  40 7   PLATELETS Thousands/uL 264 206  --  230   < > 304   NEUTROS ABS Thousands/µL  --  8 20*  --  9 01*  --  10 10*    < > = values in this interval not displayed           Results from last 7 days   Lab Units 01/20/21  0528 01/19/21  0511 01/18/21  0529 01/17/21  1045   SODIUM mmol/L 143 146* 143 146*   POTASSIUM mmol/L 4 0 4 2 3 8 3 9   CHLORIDE mmol/L 110* 114* 108 109*   CO2 mmol/L 30 31 34* 31   ANION GAP mmol/L 3* 1* 1* 6   BUN mg/dL 19 23 23 24   CREATININE mg/dL 1 50* 1 58* 1 66* 1 88*   EGFR ml/min/1 73sq m 44 42 39 34   CALCIUM mg/dL 7 8* 7 7* 8 0* 9 2     Results from last 7 days   Lab Units 01/17/21  1045   AST U/L 24   ALT U/L 52   ALK PHOS U/L 87   TOTAL PROTEIN g/dL 7 8   ALBUMIN g/dL 3 1*   TOTAL BILIRUBIN mg/dL 0 39         Results from last 7 days   Lab Units 01/20/21  0528 01/19/21  0511 01/18/21  0529 01/17/21  1045   GLUCOSE RANDOM mg/dL 103 136 90 116     Results from last 7 days   Lab Units 01/17/21  1045   LACTIC ACID mmol/L 1 3     Results from last 7 days   Lab Units 01/17/21  1045   LIPASE u/L 496*     ED Treatment:   Medication Administration from 01/17/2021 0936 to 01/17/2021 1455    Date/Time Order Dose Route Action   01/17/2021 1044 sodium chloride 0 9 % bolus 1,000 mL 1,000 mL Intravenous New Bag   01/17/2021 1048 ondansetron (ZOFRAN) injection 4 mg 4 mg Intravenous Given   01/17/2021 1126 iohexol (OMNIPAQUE) 350 MG/ML injection (MULTI-DOSE) 100 mL 100 mL Intravenous Given   01/17/2021 1424 lactated ringers infusion 125 mL/hr Intravenous New Bag   01/17/2021 1426 heparin (porcine) subcutaneous injection 5,000 Units 5,000 Units Subcutaneous Given        Past Medical History:   Diagnosis Date    GERD (gastroesophageal reflux disease)     Hernia, internal 01/10/2021    Hypertension      Present on Admission:   Hiatal hernia   Lang's esophagus without dysplasia   Duodenal nodule    Admitting Diagnosis: Hiatal hernia [K44 9]  Hernia, hiatal [K44 9]  Abdominal pain [R10 9]  Nausea & vomiting [R11 2]     Age/Sex: 66 y o  male     Admission Orders:    Scheduled Medications:  amLODIPine, 5 mg, Oral, Daily  docusate sodium, 100 mg, Oral, BID  heparin (porcine), 5,000 Units, Subcutaneous, Q8H MCKENZIE  iohexol, 50 mL, Oral, 90 min pre-procedure  lisinopril, 10 mg, Oral, Daily  pantoprazole, 40 mg, Intravenous, Q12H MCKENZIE  polyethylene glycol, 17 g, Oral, Daily  senna, 1 tablet, Oral, HS      Continuous IV Infusions:     PRN Meds:  HYDROmorphone, 0 5 mg, Intravenous, Q4H PRN  HYDROmorphone, 0 5 mg, Intravenous, Q3H PRN  Labetalol HCl, 10 mg, Intravenous, Q6H PRN  morphine injection, 2 mg, Intravenous, Q4H PRN  ondansetron, 4 mg, Intravenous, Q6H PRN  phenol, 2 spray, Mouth/Throat, Q2H PRN    SCDs  NPO   NGT to LCWS  Up as mitch  OR 1/18    Network Utilization Review Department  ATTENTION: Please call with any questions or concerns to [phone] and carefully listen to the prompts so that you are directed to the right person   All voicemails are confidential   Doug Grace all requests for admission clinical reviews, approved or denied determinations and any other requests to dedicated fax number below belonging to the campus where the patient is receiving treatment   List of dedicated fax numbers for the Facilities:  [address] DENIALS (Administrative/Medical Necessity) [phone]   [address] (Maternity/NICU/Pediatrics) [phone] [address] 40 [address] [facility] [address] Lexie Castellano 6369 (  Olayinka Oneill "Arianna" 103) [address] [address] [place] [address] [place] [address] [phone]

## 2021-01-21 NOTE — DISCHARGE SUMMARY
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    Resolved Problems  Date Reviewed: 1/21/2021    None          Attending: Dr Gemma Grubbs Physician(s): none    Procedures Performed: LAPAROSCOPIC REPAIR OF PARAESOPHAGEAL HERNIA WITH MESH  PARTIAL FUNDOPLICATION,  EGD 1/90/11    Pathology: A  Para-esophageal hernia sac:     - Fibrovascular adipose tissue and smooth muscle consistent with hernia sac  - Single reactive lymph node; negative for neoplasia  Hospital Course: Mr Joseph King is a 66year old man who presented to the [facility] ED 1/17 with complaints of abdominal pain, nausea and vomiting with a known hiatal hernia  He was admitted to the thoracic surgery service and an NGT was placed  He was taken to the OR 1/18 for the above procedure for repair of this  Operative findings included a giant incarcerated type III paraesophageal hernia  He tolerated it well and was transferred to [facility] with sips of clear liquids diet  On POD#1, a barium swallow demonstrated no esophageal leak so his diet was advanced to full liquids  On POD#2, he was tolerating full liquids without trouble, and PT/OT worked with the patient, clearing him for return to previous environment  On POD#3, the patient was deemed stable for discharge home  Diet to remain full liquid for 4 days then transition to soft foods as able until follow up appointment with Dr Candy Mooney in two weeks       Condition at Discharge: stable     Discharge instructions/Information to patient and family:   See after visit summary for information provided to patient and family  Provisions for Follow-Up Care:  See after visit summary for information related to follow-up care and any pertinent home health orders  Disposition: Home          Planned Readmission: No    Discharge Statement   I spent 30 minutes discharging the patient  This time was spent on the day of discharge  I had direct contact with the patient on the day of discharge  Additional documentation is required if more than 30 minutes were spent on discharge  Discharge Medications:  See after visit summary for reconciled discharge medications provided to patient and family

## 2021-01-21 NOTE — PROGRESS NOTES
Progress Note - Thoracic Surgery   Enid Found 66 y o  male MRN: [id number]  Unit/Bed#: [facility] 824-01 Encounter: [id number]    Assessment:  66 y o  M w/ hx of a giant incarcerated paraesophageal hernia with obstruction, now s/p laparoscopic paraesophageal hernia repair with mesh and partial fundoplication 8/52    Afebrile  VSS  On room air     Plan:  Continue Full liquids   Pulmonary toilet  Prn analgesia   OOB/Ambulate  PT/OT -> return to previous environment   DVT ppx  Anticipate discharge home today       Subjective/Objective     Subjective: POD3 from laparoscopic paraesophageal hernia repair  No acute events overnight  Pain is controlled on prn analgesia  Patient is having no issues with swallowing his full liquids, however the ice cream and milk are giving him diarrhea  No fevers, chills  Objective:    Blood pressure 149/88, pulse 99, temperature 98 3 °F (36 8 °C), resp  rate 16, height 5' 11" (1 803 m), weight 81 3 kg (179 lb 3 7 oz), SpO2 92 %  ,Body mass index is 25 kg/m²        Intake/Output Summary (Last 24 hours) at 1/21/2021 0612  Last data filed at 1/21/2021 0532  Gross per 24 hour   Intake 1100 ml   Output 850 ml   Net 250 ml       Invasive Devices     Peripheral Intravenous Line            Peripheral IV 01/17/21 Right Forearm 3 days    Peripheral IV 01/18/21 Left Hand 2 days                Physical Exam:   NAD, alert and oriented x3  Normocephalic, atraumatic  MMM, EOMI, PERRLA  Norm resp effort on room air  RRR  Abd soft, NT/ND, incisions c/d/i  No calf tenderness or peripheral edema  Motor/sensation intact in distal extremities  CN grossly intact  -rash/lesions      Lab, Imaging and other studies:  CBC:   No results found for: WBC, HGB, HCT, MCV, PLT, ADJUSTEDWBC, MCH, MCHC, RDW, MPV, NRBC, CMP: No results found for: SODIUM, K, CL, CO2, ANIONGAP, BUN, CREATININE, GLUCOSE, CALCIUM, AST, ALT, ALKPHOS, PROT, BILITOT, EGFR  VTE Pharmacologic Prophylaxis: Heparin  VTE Mechanical Prophylaxis: sequential compression device

## 2021-01-22 NOTE — UTILIZATION REVIEW
Notification of Discharge  This is a Notification of Discharge from our facility [address]  Please be advised that this patient has been discharge from our facility  Below you will find the admission and discharge date and time including the patients disposition  PRESENTATION DATE: 1/17/2021 10:05 AM  OBS ADMISSION DATE:   IP ADMISSION DATE: 1/17/21 1330   DISCHARGE DATE: 1/21/2021  9:17 AM  DISPOSITION: Home/Self Care Home/Self Care   Admission Orders listed below:  Admission Orders (From admission, onward)     Ordered        01/17/21 1331  INPATIENT ADMISSION  Once                   Please contact the UR Department if additional information is required to close this patient's authorization/case  [address] Utilization Review Department  Main: [phone] x carefully listen to the prompts  All voicemails are confidential   [email or web address]  Send all requests for admission clinical reviews, approved or denied determinations and any other requests to dedicated fax number below belonging to the campus where the patient is receiving treatment   List of dedicated fax numbers:  [address] DENIALS (Administrative/Medical Necessity) [phone]   [address] (Maternity/NICU/Pediatrics) [phone]   Caroline Escobar [phone]   Mariaelena George [phone]   Bharathi Johns [phone]   [facility] [address] [place] [address] [phone]   [facility]  [phone]   [address] [facility]  [address] [facility] 1000 [address] [facility] [phone]

## 2021-01-26 ENCOUNTER — TELEPHONE (OUTPATIENT)
Dept: CARDIAC SURGERY | Facility: CLINIC | Age: 76
End: 2021-01-26

## 2021-01-26 NOTE — TELEPHONE ENCOUNTER
Spoke to patient at [phone] for routine post-op call,  He is tolerating soft foods without dysphagia  He denies any abdominal pain, heartburn, constipation, incisional redness/drainage or pain  He is staying active around the house  He will maintain soft diet until follow up 2/2, and he will get a CXR within a few days of that appointment  He will call in the interim with any questions or concerns

## 2021-01-29 ENCOUNTER — APPOINTMENT (OUTPATIENT)
Dept: RADIOLOGY | Age: 76
End: 2021-01-29
Payer: COMMERCIAL

## 2021-01-29 DIAGNOSIS — K44.9 HIATAL HERNIA: ICD-10-CM

## 2021-01-29 PROCEDURE — 71046 X-RAY EXAM CHEST 2 VIEWS: CPT

## 2021-02-02 ENCOUNTER — TELEMEDICINE (OUTPATIENT)
Dept: CARDIAC SURGERY | Facility: CLINIC | Age: 76
End: 2021-02-02

## 2021-02-02 DIAGNOSIS — K44.0 PARAESOPHAGEAL HERNIA WITH OBSTRUCTION BUT NO GANGRENE: Primary | ICD-10-CM

## 2021-02-02 PROCEDURE — 99024 POSTOP FOLLOW-UP VISIT: CPT | Performed by: THORACIC SURGERY (CARDIOTHORACIC VASCULAR SURGERY)

## 2021-02-02 NOTE — ASSESSMENT & PLAN NOTE
Mr Jennefer Scheuermann is doing well after repair of a paraesophageal hernia  We will see him back in 4 weeks for another postop appointment

## 2021-02-02 NOTE — PROGRESS NOTES
Virtual Regular Visit      Assessment/Plan:    Problem List Items Addressed This Visit        Other    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 - Primary     Mr  Aston Christensen is doing well after repair of a paraesophageal hernia  We will see him back in 4 weeks for another postop appointment  Reason for visit is   Chief Complaint   Patient presents with    Virtual Regular Visit        Encounter provider Lyubov Corea MD    Provider located at [address] [place] [id number]  [phone]      Recent Visits  Date Type Provider Dept   01/26/21 [facility]phone [facility]   Showing recent visits within past 7 days and meeting all other requirements     Today's Visits  Date Type Provider Dept   02/02/21 Telemedicine Lyubov Corea MD [facility]   Showing today's visits and meeting all other requirements     Future Appointments  No visits were found meeting these conditions  Showing future appointments within next 150 days and meeting all other requirements        The patient was identified by name and date of birth  Katalina Murdock was informed that this is a telemedicine visit and that the visit is being conducted through [facility] and patient was informed that this is a secure, HIPAA-compliant platform  He agrees to proceed     My office door was closed  No one else was in the room  He acknowledged consent and understanding of privacy and security of the video platform  The patient has agreed to participate and understands they can discontinue the visit at any time  Patient is aware this is a billable service  Diagnosis:  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  Procedure: Emergent EGD, Lap PEH repair with mesh, partial fundopliation on 1/18/2021      Subjective  Katalina Murdock is a 66 y o  male is here for postop follow up after surgery for an incarcerated HH  Mr Tiki Garner is now about 2 weeks out from an emergent repair of an obstructed giant type 3 PEH  He has done well since surgery  He has no significant pain and his incisions seem to be healing well  He is tolerating all types of food including breads without dysphagia  He has no heartburn or regurgitant probems  I have personally reviewed his CXR and this demonstrates that his stomach is within his abdomen  There is a small right pleural effusion  Past Medical History:   Diagnosis Date    GERD (gastroesophageal reflux disease)     Hernia, internal 01/10/2021    Hypertension        Past Surgical History:   Procedure Laterality Date    ESOPHAGOGASTRODUODENOSCOPY N/A 10/18/2018    Procedure: ESOPHAGOGASTRODUODENOSCOPY (EGD); Surgeon: Kailey Guzmán MD;  [facility]; Service: Gastroenterology    ESOPHAGOGASTRODUODENOSCOPY N/A 1/18/2021    Procedure: ESOPHAGOGASTRODUODENOSCOPY (EGD); Surgeon: Theola Alpers, MD;  [facility];  Service: Thoracic    NO PAST SURGERIES      PARAESOPHAGEAL HERNIA REPAIR N/A 1/18/2021    Procedure: REPAIR HERNIA PARAESOPHAGEAL  LAPAROSCOPIC;  Surgeon: Theola Alpers, MD;  [facility];  Service: Thoracic    SD COLONOSCOPY FLX DX W/COLLJ SPEC WHEN PFRMD N/A 11/21/2017    Procedure: EGD AND COLONOSCOPY;  Surgeon: Daniel Lu MD;  [facility]; Service: Gastroenterology    SD EDG US EXAM SURGICAL ALTER STOM DUODENUM/JEJUNUM N/A 10/18/2018    Procedure: LINEAR ENDOSCOPIC U/S;  Surgeon: Kailey Guzmán MD;  [facility];   Service: Gastroenterology       Current Outpatient Medications   Medication Sig Dispense Refill    acetaminophen (TYLENOL) 325 mg tablet Take 2 tablets (650 mg total) by mouth every 6 (six) hours as needed for mild pain, headaches or fever 30 tablet 0    amLODIPine (NORVASC) 5 mg tablet Take 5 mg by mouth daily      docusate sodium (COLACE) 100 mg capsule Take 1 capsule (100 mg total) by mouth 2 (two) times a day as needed for constipation 10 capsule 0    lisinopril (ZESTRIL) 10 mg tablet Take 10 mg by mouth daily      omeprazole (PriLOSEC) 20 mg delayed release capsule Take 20 mg by mouth daily        No current facility-administered medications for this visit  No Known Allergies    Review of Systems   Constitutional: Positive for fatigue  Negative for appetite change and fever  HENT: Negative for sore throat, trouble swallowing and voice change  Respiratory: Negative for cough and shortness of breath  Cardiovascular: Negative for chest pain and leg swelling  Gastrointestinal: Negative for abdominal distention and abdominal pain  Video Exam    There were no vitals filed for this visit  Physical Exam  Constitutional:       General: He is not in acute distress  Appearance: Normal appearance  Eyes:      General: No scleral icterus  Pulmonary:      Effort: Pulmonary effort is normal  No respiratory distress  Skin:     Findings: No rash (on visible skin)  Neurological:      Mental Status: He is alert and oriented to person, place, and time  Mental status is at baseline  Psychiatric:         Mood and Affect: Mood normal          Behavior: Behavior normal          Thought Content: Thought content normal          Judgment: Judgment normal           I spent 15 minutes directly with the patient during this visit      VIRTUAL VISIT DISCLAIMER    Suman Castañeda acknowledges that he has consented to an online visit or consultation  He understands that the online visit is based solely on information provided by him, and that, in the absence of a face-to-face physical evaluation by the physician, the diagnosis he receives is both limited and provisional in terms of accuracy and completeness  This is not intended to replace a full medical face-to-face evaluation by the physician  Suman Castañeda understands and accepts these terms

## 2021-03-02 ENCOUNTER — OFFICE VISIT (OUTPATIENT)
Dept: CARDIAC SURGERY | Facility: CLINIC | Age: 76
End: 2021-03-02

## 2021-03-02 VITALS
WEIGHT: 179.01 LBS | DIASTOLIC BLOOD PRESSURE: 83 MMHG | OXYGEN SATURATION: 99 % | RESPIRATION RATE: 16 BRPM | HEIGHT: 71 IN | TEMPERATURE: 97.2 F | BODY MASS INDEX: 25.06 KG/M2 | HEART RATE: 55 BPM | SYSTOLIC BLOOD PRESSURE: 133 MMHG

## 2021-03-02 DIAGNOSIS — K44.0 PARAESOPHAGEAL HERNIA WITH OBSTRUCTION BUT NO GANGRENE: Primary | ICD-10-CM

## 2021-03-02 PROCEDURE — 99024 POSTOP FOLLOW-UP VISIT: CPT | Performed by: THORACIC SURGERY (CARDIOTHORACIC VASCULAR SURGERY)

## 2021-03-02 NOTE — ASSESSMENT & PLAN NOTE
Mr Michael Cornejo presents 6 weeks out from emergent laparoscopic paraesophageal hernia repair with partial fundoplication and mesh  He is doing very well at this point post-operatively, tolerating all food consistencies without any dysphagia, heartburn or regurgitation  At this point he does not have any activity restrictions  He will return to the office for a 6 month follow up with a CXR  All questions addressed, and he is in agreement with this plan

## 2021-03-02 NOTE — PROGRESS NOTES
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  -     XR chest pa & lateral; Future          Thoracic History   Diagnosis:  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  Procedure: Emergent EGD, Lap PEH repair with mesh, partial fundopliation on 1/18/2021  Subjective:    Patient ID: Sukhdev Waller is a 66 y o  male  HPI   Mr Yonathan Garcia is a 66year old man who underwent an emergent laparoscopic paraesophageal hernia repair with partial fundoplication 6/31/83  He was seen virtually by Dr Dennis Rosas 2/2/21 at which time he was doing well without significant pain or dysphagia  CXR at that time was stable post-operatively with gastric bubble below the diaphragm and a small right pleural effusion  He returns today for a second post-operative visit  On discussion, he is eating whatever he wants without any dysphagia  He denies any heartburn, regurgitation, nausea, vomiting, trouble with bowel movements  He states that he does not get as full as quickly since surgery       The following portions of the patient's history were reviewed and updated as appropriate: allergies, current medications, past family history, past medical history, past social history, past surgical history and problem list     Review of Systems   Constitutional: Negative for activity change, appetite change, chills, diaphoresis, fatigue, fever and unexpected weight change  HENT: Negative for trouble swallowing  Respiratory: Negative for cough, chest tightness, shortness of breath and wheezing  Cardiovascular: Negative for chest pain  Gastrointestinal: Negative for abdominal pain, nausea and vomiting  Musculoskeletal: Negative for joint swelling and myalgias  Skin: Negative for color change, rash and wound  Objective:   Physical Exam  Constitutional:       General: He is not in acute distress  Appearance: Normal appearance  HENT:      Head: Normocephalic and atraumatic  Eyes:      General: No scleral icterus  Conjunctiva/sclera: Conjunctivae normal    Neck:      Musculoskeletal: Neck supple  Cardiovascular:      Rate and Rhythm: Normal rate and regular rhythm  Pulmonary:      Effort: Pulmonary effort is normal  No respiratory distress  Breath sounds: Normal breath sounds  Abdominal:      General: There is no distension  Palpations: Abdomen is soft  Comments: Well healed laparoscopic incisions   Lymphadenopathy:      Cervical: No cervical adenopathy  Skin:     General: Skin is warm and dry  Neurological:      General: No focal deficit present  Mental Status: He is alert and oriented to person, place, and time  Psychiatric:         Mood and Affect: Mood normal      /83   Pulse 55   Temp (!) 97 2 °F (36 2 °C) (Tympanic)   Resp 16   Ht 5' 11" (1 803 m)   Wt 81 2 kg (179 lb 0 2 oz)   SpO2 99%   BMI 24 97 kg/m²     Xr Chest Pa & Lateral    Result Date: 2/1/2021  Impression No acute cardiopulmonary disease  Small right effusion  Workstation performed: [id number]        Ct Chest Abdomen Pelvis W Contrast    Result Date: 1/17/2021  Narrative CT CHEST, ABDOMEN AND PELVIS WITH IV CONTRAST INDICATION:   Abdominal pain, acute, nonlocalized Hx of hiatal hernia, presenting with abdomina discomfort, N/V, hiccups  COMPARISON:  None  TECHNIQUE: CT examination of the chest, abdomen and pelvis was performed  Axial, sagittal, and coronal 2D reformatted images were created from the source data and submitted for interpretation  Radiation dose length product (DLP) for this visit:  855 26 mGy-cm   This examination, like all CT scans performed in the [facility], was performed utilizing techniques to minimize radiation dose exposure, including the use of iterative  reconstruction and automated exposure control  IV Contrast:  100 mL of iohexol (OMNIPAQUE) <See Chart>  of iohexol (OMNIPAQUE) Enteric Contrast: Enteric contrast was administered  FINDINGS: CHEST LUNGS:  No focal airspace consolidation is identified  Central airways are patent  There are no suspicious pulmonary nodules  PLEURA:  Unremarkable  HEART/GREAT VESSELS:  Heart is normal in size  Thoracic aortic and coronary artery calcification is present  MEDIASTINUM AND SAMI:  There is thickening of the esophagus with fluid extending to the level of the upper esophagus  There is redemonstration of a complex paraesophageal hiatal hernia  Overall appearance is unchanged since prior examinations  CHEST WALL AND LOWER NECK:   Unremarkable  ABDOMEN LIVER/BILIARY TREE:  Unremarkable  GALLBLADDER:  There are gallstone(s) within the gallbladder, without pericholecystic inflammatory changes  SPLEEN:  No focal splenic lesions  PANCREAS:  Unremarkable  ADRENAL GLANDS:  Unremarkable  KIDNEYS/URETERS:  One or more simple renal cyst(s) is noted  Otherwise unremarkable kidneys  No hydronephrosis  STOMACH AND BOWEL:  Dense contrast limits evaluation of the colon  No evidence for bowel obstruction  APPENDIX:  No findings to suggest appendicitis  ABDOMINOPELVIC CAVITY:  No ascites  No pneumoperitoneum  No lymphadenopathy  VESSELS:  Unremarkable for patient's age  PELVIS REPRODUCTIVE ORGANS:  Unremarkable for patient's age  URINARY BLADDER:  Unremarkable  ABDOMINAL WALL/INGUINAL REGIONS:  Unremarkable   OSSEOUS STRUCTURES:  There are degenerative changes of the spine      Impression No significant interval change since prior examinations  Redemonstration of marked distention of the stomach with fluid and a large complex paraesophageal type hiatal hernia  Presence of a gastric volvulus is not entirely excluded however does not appear to be clearly present on recent upper GI examination  Surgical team has been consulted as the patient presents with persistent nausea vomiting  I personally discussed this study with Juan Alberto Sylvie on 1/17/2021 at 12:46 PM  Workstation performed: [id number]        Fl Esophagram Complete    Result Date: 1/19/2021  Narrative LIMITED ESOPHAGRAM: INDICATION:  Follow-up status post paraesophageal hernia repair  COMPARISON:  12/11/2020  IMAGES:  26 FLUOROSCOPY TIME:  37 seconds FINDINGS: A limited single contrast upper GI study was performed with approximately 50 mL Omnipaque 350 contrast  Distal esophagus is unremarkable  Free passage of contrast through the lower esophageal sphincter was noted  Stomach is grossly unremarkable  No hernia is identified  No evidence of leak was demonstrated  Impression No evidence of leak  Workstation performed: [id number]     Fl Barium Swallow    Result Date: 1/13/2021  Narrative BARIUM SWALLOW-ESOPHAGRAM INDICATION:   evaluation of hiatal hernia  evaluation of gastric emptying  COMPARISON:  CT 1/10/2021, and notes from endoscopic study 1/12/2021  IMAGES:  252  (this includes cine capture images) FLUOROSCOPY TIME:   1 6 minutes  TECHNIQUE: The patient was given effervescent granules and barium by mouth and images of the esophagus were obtained  FINDINGS: Proximal and mid esophagus are unremarkable  Examination demonstrates a very large complex hiatal hernia  While a portion of the stomach remains below the left hemidiaphragm, a large portion of the stomach is also located above the diaphragm, to the right of midline  The antrum and duodenum are displaced superiorly toward the hiatus    Currently, there is no evidence of inflammatory change or gastric outlet obstruction  Gastroesophageal reflux was intermittently seen      Impression Large complex hiatal hernia  Essentially, this represents a large type III but predominantly paraesophageal hernia  Fundus remains below the diaphragm, but there is a transverse orientation of the remainder of the stomach above the diaphragm, which extends to the right of midline  The gastric outlet and duodenum are displaced superiorly  There is no evidence of gastric outlet obstruction at this time   Workstation performed: [id number]

## 2021-07-16 ENCOUNTER — TELEPHONE (OUTPATIENT)
Dept: CARDIAC SURGERY | Facility: CLINIC | Age: 76
End: 2021-07-16

## 2021-07-16 ENCOUNTER — APPOINTMENT (OUTPATIENT)
Dept: RADIOLOGY | Age: 76
End: 2021-07-16
Payer: COMMERCIAL

## 2021-07-16 DIAGNOSIS — K44.0 PARAESOPHAGEAL HERNIA WITH OBSTRUCTION BUT NO GANGRENE: ICD-10-CM

## 2021-07-16 PROCEDURE — 71046 X-RAY EXAM CHEST 2 VIEWS: CPT

## 2021-07-19 ENCOUNTER — OFFICE VISIT (OUTPATIENT)
Dept: CARDIAC SURGERY | Facility: CLINIC | Age: 76
End: 2021-07-19
Payer: COMMERCIAL

## 2021-07-19 VITALS
HEIGHT: 71 IN | TEMPERATURE: 98.1 F | WEIGHT: 176.37 LBS | DIASTOLIC BLOOD PRESSURE: 88 MMHG | RESPIRATION RATE: 18 BRPM | SYSTOLIC BLOOD PRESSURE: 140 MMHG | HEART RATE: 53 BPM | OXYGEN SATURATION: 97 % | BODY MASS INDEX: 24.69 KG/M2

## 2021-07-19 DIAGNOSIS — K44.0 PARAESOPHAGEAL HERNIA WITH OBSTRUCTION BUT NO GANGRENE: Primary | ICD-10-CM

## 2021-07-19 PROCEDURE — 99213 OFFICE O/P EST LOW 20 MIN: CPT | Performed by: PHYSICIAN ASSISTANT

## 2021-07-19 NOTE — PROGRESS NOTES
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  Mr Tesfaye Jerry presents 6 months out from [facility] with mesh and partial fundoplication  He is doing well from a thoracic surgery standpoint without any dysphagia, heartburn or regurgitation  He is not taking any antacid medication  He does have an episode of diarrhea weekly for which I recommended he reach out to his PCP or GI specialist if this worsens  His CXR demonstrates gastric bubble below the left hemidiaphragm  He will call us on an as needed basis at this point should he develop dysphagia, heartburn or regurgitation  Diagnoses and all orders for this visit:    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          Thoracic History   Diagnosis:  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  Procedure: Emergent EGD, Lap PEH repair with mesh, partial fundopliation on 1/18/2021  Subjective:    Patient ID: Jahaira Jolly is a 64 y o  male  HPI   Mr Tesfaye Jerry is a 64year old man who underwent an emergent laparoscopic paraesophageal hernia repair with partial fundoplication 1/84/68  He was last seen in the office 3/2/21 at which time he was doing great without any issues at all  He returns today 6 months out from surgery with a CXR 7/16/21  This demonstrates gastric bubble beneath the diaphragm  On discussion, he is feeling well  He does not have any trouble swallowing  He denies fevers, chills, heartburn or regurgitation  He does have diarrhea about once per week related to eating dairy  No blood or mucus  The following portions of the patient's history were reviewed and updated as appropriate: allergies, current medications, past family history, past medical history, past social history, past surgical history and problem list     Review of Systems   Constitutional: Negative for chills and fever  HENT: Negative for trouble swallowing  Respiratory: Negative for cough and shortness of breath  Cardiovascular: Negative for chest pain  Gastrointestinal: Positive for diarrhea  Negative for abdominal pain  All other systems reviewed and are negative  Objective:   Physical Exam  Constitutional:       General: He is not in acute distress  Appearance: Normal appearance  HENT:      Head: Normocephalic and atraumatic  Eyes:      General: No scleral icterus  Conjunctiva/sclera: Conjunctivae normal    Cardiovascular:      Rate and Rhythm: Normal rate and regular rhythm  Pulmonary:      Effort: Pulmonary effort is normal  No respiratory distress  Breath sounds: Normal breath sounds  Abdominal:      General: There is no distension  Palpations: Abdomen is soft  Comments: Well healed laparoscopic incisions   Musculoskeletal:      Cervical back: Neck supple  Lymphadenopathy:      Cervical: No cervical adenopathy  Skin:     General: Skin is warm and dry  Neurological:      General: No focal deficit present  Mental Status: He is alert and oriented to person, place, and time  Psychiatric:         Mood and Affect: Mood normal      /88 (BP Location: Left arm, Patient Position: Sitting, Cuff Size: Standard)   Pulse (!) 53   Temp 98 1 °F (36 7 °C)   Resp 18   Ht 5' 11" (1 803 m)   Wt 80 kg (176 lb 5 9 oz)   SpO2 97%   BMI 24 60 kg/m²     XR chest pa & lateral    Result Date: 2/1/2021  Impression No acute cardiopulmonary disease  Small right effusion  Workstation performed: [id number]        CT chest abdomen pelvis w contrast    Result Date: 1/17/2021  Narrative CT CHEST, ABDOMEN AND PELVIS WITH IV CONTRAST INDICATION:   Abdominal pain, acute, nonlocalized Hx of hiatal hernia, presenting with abdomina discomfort, N/V, hiccups  COMPARISON:  None  TECHNIQUE: CT examination of the chest, abdomen and pelvis was performed  Axial, sagittal, and coronal 2D reformatted images were created from the source data and submitted for interpretation   Radiation dose length product (DLP) for this visit:  855 26 mGy-cm   This examination, like all CT scans [facility] in [facility], was performed utilizing techniques to minimize radiation dose exposure, including the use of iterative  reconstruction and automated exposure control  IV Contrast:  100 mL of iohexol (OMNIPAQUE) <See Chart>  of iohexol (OMNIPAQUE) Enteric Contrast: Enteric contrast was administered  FINDINGS: CHEST LUNGS:  No focal airspace consolidation is identified  Central airways are patent  There are no suspicious pulmonary nodules  PLEURA:  Unremarkable  HEART/GREAT VESSELS:  Heart is normal in size  Thoracic aortic and coronary artery calcification is present  MEDIASTINUM AND SAMI:  There is thickening of the esophagus with fluid extending to the level of the upper esophagus  There is redemonstration of a complex paraesophageal hiatal hernia  Overall appearance is unchanged since prior examinations  CHEST WALL AND LOWER NECK:   Unremarkable  ABDOMEN LIVER/BILIARY TREE:  Unremarkable  GALLBLADDER:  There are gallstone(s) within the gallbladder, without pericholecystic inflammatory changes  SPLEEN:  No focal splenic lesions  PANCREAS:  Unremarkable  ADRENAL GLANDS:  Unremarkable  KIDNEYS/URETERS:  One or more simple renal cyst(s) is noted  Otherwise unremarkable kidneys  No hydronephrosis  STOMACH AND BOWEL:  Dense contrast limits evaluation of the colon  No evidence for bowel obstruction  APPENDIX:  No findings to suggest appendicitis  ABDOMINOPELVIC CAVITY:  No ascites  No pneumoperitoneum  No lymphadenopathy  VESSELS:  Unremarkable for patient's age  PELVIS REPRODUCTIVE ORGANS:  Unremarkable for patient's age  URINARY BLADDER:  Unremarkable  ABDOMINAL WALL/INGUINAL REGIONS:  Unremarkable  OSSEOUS STRUCTURES:  There are degenerative changes of the spine  Impression No significant interval change since prior examinations   Redemonstration of marked distention of the stomach with fluid and a large complex paraesophageal type hiatal hernia  Presence of a gastric volvulus is not entirely excluded however does not appear to be clearly present on recent upper GI examination  Surgical team has been consulted as the patient presents with persistent nausea vomiting  I personally discussed this study with Ignacio Ro on 1/17/2021 at 12:46 PM  Workstation performed: [id number]    FL esophagram complete    Result Date: 1/19/2021  Narrative LIMITED ESOPHAGRAM: INDICATION:  Follow-up status post paraesophageal hernia repair  COMPARISON:  12/11/2020  IMAGES:  26 FLUOROSCOPY TIME:  37 seconds FINDINGS: A limited single contrast upper GI study was performed with approximately 50 mL Omnipaque 350 contrast  Distal esophagus is unremarkable  Free passage of contrast through the lower esophageal sphincter was noted  Stomach is grossly unremarkable  No hernia is identified  No evidence of leak was demonstrated  Impression No evidence of leak  Workstation performed: [id number]     FL barium swallow    Result Date: 1/13/2021  Narrative BARIUM SWALLOW-ESOPHAGRAM INDICATION:   evaluation of hiatal hernia  evaluation of gastric emptying  COMPARISON:  CT 1/10/2021, and notes from endoscopic study 1/12/2021  IMAGES:  252  (this includes cine capture images) FLUOROSCOPY TIME:   1 6 minutes  TECHNIQUE: The patient was given effervescent granules and barium by mouth and images of the esophagus were obtained  FINDINGS: Proximal and mid esophagus are unremarkable  Examination demonstrates a very large complex hiatal hernia  While a portion of the stomach remains below the left hemidiaphragm, a large portion of the stomach is also located above the diaphragm, to the right of midline  The antrum and duodenum are displaced superiorly toward the hiatus  Currently, there is no evidence of inflammatory change or gastric outlet obstruction   Gastroesophageal reflux was intermittently seen      Impression Large complex hiatal hernia  Essentially, this represents a large type III but predominantly paraesophageal hernia  Fundus remains below the diaphragm, but there is a transverse orientation of the remainder of the stomach above the diaphragm, which extends to the right of midline  The gastric outlet and duodenum are displaced superiorly  There is no evidence of gastric outlet obstruction at this time   Workstation performed: [id number]

## 2021-07-19 NOTE — ASSESSMENT & PLAN NOTE
Mr Magdalena Vilchis presents 6 months out from [facility] with mesh and partial fundoplication  He is doing well from a thoracic surgery standpoint without any dysphagia, heartburn or regurgitation  He is not taking any antacid medication  He does have an episode of diarrhea weekly for which I recommended he reach out to his PCP or GI specialist if this worsens  His CXR demonstrates gastric bubble below the left hemidiaphragm  He will call us on an as needed basis at this point should he develop dysphagia, heartburn or regurgitation

## 2021-09-24 ENCOUNTER — TELEPHONE (OUTPATIENT)
Dept: DERMATOLOGY | Facility: CLINIC | Age: 76
End: 2021-09-24

## 2021-10-05 ENCOUNTER — TELEPHONE (OUTPATIENT)
Dept: GASTROENTEROLOGY | Facility: AMBULARY SURGERY CENTER | Age: 76
End: 2021-10-05

## 2021-10-05 ENCOUNTER — OFFICE VISIT (OUTPATIENT)
Dept: GASTROENTEROLOGY | Facility: AMBULARY SURGERY CENTER | Age: 76
End: 2021-10-05
Payer: OTHER GOVERNMENT

## 2021-10-05 VITALS
SYSTOLIC BLOOD PRESSURE: 180 MMHG | HEIGHT: 71 IN | DIASTOLIC BLOOD PRESSURE: 78 MMHG | WEIGHT: 179 LBS | BODY MASS INDEX: 25.06 KG/M2

## 2021-10-05 DIAGNOSIS — K22.70 BARRETT'S ESOPHAGUS WITHOUT DYSPLASIA: Primary | ICD-10-CM

## 2021-10-05 DIAGNOSIS — K44.0 PARAESOPHAGEAL HERNIA WITH OBSTRUCTION BUT NO GANGRENE: ICD-10-CM

## 2021-10-05 DIAGNOSIS — D12.6 TUBULAR ADENOMA OF COLON: ICD-10-CM

## 2021-10-05 PROCEDURE — 99214 OFFICE O/P EST MOD 30 MIN: CPT | Performed by: INTERNAL MEDICINE

## 2021-10-05 RX ORDER — FERROUS SULFATE 325(65) MG
325 TABLET ORAL
COMMUNITY

## 2021-10-06 PROBLEM — D12.6 TUBULAR ADENOMA OF COLON: Status: ACTIVE | Noted: 2021-10-06

## 2021-10-29 ENCOUNTER — CONSULT (OUTPATIENT)
Dept: DERMATOLOGY | Facility: CLINIC | Age: 76
End: 2021-10-29
Payer: OTHER GOVERNMENT

## 2021-10-29 VITALS — WEIGHT: 178 LBS | BODY MASS INDEX: 24.11 KG/M2 | HEIGHT: 72 IN

## 2021-10-29 DIAGNOSIS — D48.5 NEOPLASM OF UNCERTAIN BEHAVIOR OF SKIN: Primary | ICD-10-CM

## 2021-10-29 PROCEDURE — 88305 TISSUE EXAM BY PATHOLOGIST: CPT | Performed by: STUDENT IN AN ORGANIZED HEALTH CARE EDUCATION/TRAINING PROGRAM

## 2021-10-29 PROCEDURE — 11102 TANGNTL BX SKIN SINGLE LES: CPT | Performed by: DERMATOLOGY

## 2021-10-29 PROCEDURE — 99204 OFFICE O/P NEW MOD 45 MIN: CPT | Performed by: DERMATOLOGY

## 2021-11-04 ENCOUNTER — TELEPHONE (OUTPATIENT)
Dept: DERMATOLOGY | Facility: CLINIC | Age: 76
End: 2021-11-04

## 2021-11-18 ENCOUNTER — TELEPHONE (OUTPATIENT)
Dept: GASTROENTEROLOGY | Facility: AMBULARY SURGERY CENTER | Age: 76
End: 2021-11-18

## 2021-11-22 ENCOUNTER — TELEPHONE (OUTPATIENT)
Dept: DERMATOLOGY | Facility: CLINIC | Age: 76
End: 2021-11-22

## 2021-11-22 ENCOUNTER — PROCEDURE VISIT (OUTPATIENT)
Dept: DERMATOLOGY | Facility: CLINIC | Age: 76
End: 2021-11-22
Payer: COMMERCIAL

## 2021-11-22 VITALS
DIASTOLIC BLOOD PRESSURE: 70 MMHG | HEIGHT: 72 IN | BODY MASS INDEX: 23.84 KG/M2 | TEMPERATURE: 97.5 F | RESPIRATION RATE: 18 BRPM | SYSTOLIC BLOOD PRESSURE: 128 MMHG | WEIGHT: 176 LBS | HEART RATE: 72 BPM

## 2021-11-22 DIAGNOSIS — C44.219 BASAL CELL CARCINOMA (BCC) OF HELIX OF LEFT EAR: ICD-10-CM

## 2021-11-22 PROCEDURE — 17311 MOHS 1 STAGE H/N/HF/G: CPT | Performed by: DERMATOLOGY

## 2021-11-22 PROCEDURE — 17312 MOHS ADDL STAGE: CPT | Performed by: DERMATOLOGY

## 2021-11-22 PROCEDURE — 13152 CMPLX RPR E/N/E/L 2.6-7.5 CM: CPT | Performed by: DERMATOLOGY

## 2021-11-29 ENCOUNTER — OFFICE VISIT (OUTPATIENT)
Dept: DERMATOLOGY | Facility: CLINIC | Age: 76
End: 2021-11-29

## 2021-11-29 DIAGNOSIS — Z48.02 ENCOUNTER FOR REMOVAL OF SUTURES: Primary | ICD-10-CM

## 2021-11-29 PROCEDURE — 99024 POSTOP FOLLOW-UP VISIT: CPT | Performed by: DERMATOLOGY

## 2021-12-22 ENCOUNTER — TELEPHONE (OUTPATIENT)
Dept: GASTROENTEROLOGY | Facility: HOSPITAL | Age: 76
End: 2021-12-22

## 2021-12-23 ENCOUNTER — HOSPITAL ENCOUNTER (OUTPATIENT)
Dept: GASTROENTEROLOGY | Facility: HOSPITAL | Age: 76
Setting detail: OUTPATIENT SURGERY
Discharge: HOME | End: 2021-12-23
Attending: INTERNAL MEDICINE
Payer: COMMERCIAL

## 2021-12-23 ENCOUNTER — ANESTHESIA EVENT (OUTPATIENT)
Dept: GASTROENTEROLOGY | Facility: HOSPITAL | Age: 76
End: 2021-12-23

## 2021-12-23 ENCOUNTER — ANESTHESIA (OUTPATIENT)
Dept: GASTROENTEROLOGY | Facility: HOSPITAL | Age: 76
End: 2021-12-23

## 2021-12-23 VITALS
HEART RATE: 61 BPM | DIASTOLIC BLOOD PRESSURE: 75 MMHG | SYSTOLIC BLOOD PRESSURE: 131 MMHG | WEIGHT: 176 LBS | OXYGEN SATURATION: 96 % | HEIGHT: 72 IN | BODY MASS INDEX: 23.84 KG/M2 | RESPIRATION RATE: 16 BRPM | TEMPERATURE: 97.4 F

## 2021-12-23 DIAGNOSIS — K44.0 PARAESOPHAGEAL HERNIA WITH OBSTRUCTION BUT NO GANGRENE: ICD-10-CM

## 2021-12-23 DIAGNOSIS — K22.70 BARRETT'S ESOPHAGUS WITHOUT DYSPLASIA: ICD-10-CM

## 2021-12-23 PROBLEM — I10 HTN (HYPERTENSION): Status: ACTIVE | Noted: 2021-12-23

## 2021-12-23 PROBLEM — K21.9 GASTROESOPHAGEAL REFLUX DISEASE: Status: ACTIVE | Noted: 2021-12-23

## 2021-12-23 PROCEDURE — 88305 TISSUE EXAM BY PATHOLOGIST: CPT | Performed by: PATHOLOGY

## 2021-12-23 PROCEDURE — 43239 EGD BIOPSY SINGLE/MULTIPLE: CPT | Performed by: INTERNAL MEDICINE

## 2021-12-23 PROCEDURE — 88342 IMHCHEM/IMCYTCHM 1ST ANTB: CPT | Performed by: PATHOLOGY

## 2021-12-23 PROCEDURE — 43259 EGD US EXAM DUODENUM/JEJUNUM: CPT | Performed by: INTERNAL MEDICINE

## 2021-12-23 RX ORDER — PROPOFOL 10 MG/ML
INJECTION, EMULSION INTRAVENOUS CONTINUOUS PRN
Status: DISCONTINUED | OUTPATIENT
Start: 2021-12-23 | End: 2021-12-23

## 2021-12-23 RX ORDER — FENTANYL CITRATE 50 UG/ML
INJECTION, SOLUTION INTRAMUSCULAR; INTRAVENOUS AS NEEDED
Status: DISCONTINUED | OUTPATIENT
Start: 2021-12-23 | End: 2021-12-23

## 2021-12-23 RX ORDER — SODIUM CHLORIDE, SODIUM LACTATE, POTASSIUM CHLORIDE, CALCIUM CHLORIDE 600; 310; 30; 20 MG/100ML; MG/100ML; MG/100ML; MG/100ML
INJECTION, SOLUTION INTRAVENOUS CONTINUOUS PRN
Status: DISCONTINUED | OUTPATIENT
Start: 2021-12-23 | End: 2021-12-23

## 2021-12-23 RX ORDER — PROPOFOL 10 MG/ML
INJECTION, EMULSION INTRAVENOUS AS NEEDED
Status: DISCONTINUED | OUTPATIENT
Start: 2021-12-23 | End: 2021-12-23

## 2021-12-23 RX ORDER — LIDOCAINE HYDROCHLORIDE 20 MG/ML
INJECTION, SOLUTION INFILTRATION; PERINEURAL AS NEEDED
Status: DISCONTINUED | OUTPATIENT
Start: 2021-12-23 | End: 2021-12-23

## 2021-12-23 RX ADMIN — FENTANYL CITRATE 50 MCG: 50 INJECTION, SOLUTION INTRAMUSCULAR; INTRAVENOUS at 08:26

## 2021-12-23 RX ADMIN — PROPOFOL 130 MCG/KG/MIN: 10 INJECTION, EMULSION INTRAVENOUS at 08:34

## 2021-12-23 RX ADMIN — FENTANYL CITRATE 25 MCG: 50 INJECTION, SOLUTION INTRAMUSCULAR; INTRAVENOUS at 09:05

## 2021-12-23 RX ADMIN — LIDOCAINE HYDROCHLORIDE 5 ML: 20 INJECTION, SOLUTION INFILTRATION; PERINEURAL at 08:30

## 2021-12-23 RX ADMIN — PROPOFOL 30 MG: 10 INJECTION, EMULSION INTRAVENOUS at 08:34

## 2021-12-23 RX ADMIN — PROPOFOL 100 MG: 10 INJECTION, EMULSION INTRAVENOUS at 08:30

## 2021-12-23 RX ADMIN — SODIUM CHLORIDE, SODIUM LACTATE, POTASSIUM CHLORIDE, AND CALCIUM CHLORIDE: .6; .31; .03; .02 INJECTION, SOLUTION INTRAVENOUS at 08:13

## 2022-01-07 ENCOUNTER — TELEPHONE (OUTPATIENT)
Dept: GASTROENTEROLOGY | Facility: CLINIC | Age: 77
End: 2022-01-07

## 2022-02-11 ENCOUNTER — TELEPHONE (OUTPATIENT)
Dept: GASTROENTEROLOGY | Facility: CLINIC | Age: 77
End: 2022-02-11

## 2022-02-11 ENCOUNTER — TELEPHONE (OUTPATIENT)
Dept: GASTROENTEROLOGY | Facility: HOSPITAL | Age: 77
End: 2022-02-11

## 2022-02-14 ENCOUNTER — ANESTHESIA EVENT (OUTPATIENT)
Dept: GASTROENTEROLOGY | Facility: HOSPITAL | Age: 77
End: 2022-02-14

## 2022-02-14 ENCOUNTER — HOSPITAL ENCOUNTER (OUTPATIENT)
Dept: GASTROENTEROLOGY | Facility: HOSPITAL | Age: 77
Setting detail: OUTPATIENT SURGERY
Discharge: HOME | End: 2022-02-14
Attending: INTERNAL MEDICINE
Payer: COMMERCIAL

## 2022-02-14 ENCOUNTER — ANESTHESIA (OUTPATIENT)
Dept: GASTROENTEROLOGY | Facility: HOSPITAL | Age: 77
End: 2022-02-14

## 2022-02-14 VITALS
HEIGHT: 72 IN | RESPIRATION RATE: 18 BRPM | BODY MASS INDEX: 23.84 KG/M2 | WEIGHT: 176 LBS | HEART RATE: 65 BPM | DIASTOLIC BLOOD PRESSURE: 88 MMHG | SYSTOLIC BLOOD PRESSURE: 152 MMHG | OXYGEN SATURATION: 97 % | TEMPERATURE: 96.1 F

## 2022-02-14 DIAGNOSIS — D12.6 TUBULAR ADENOMA OF COLON: ICD-10-CM

## 2022-02-14 PROCEDURE — 88305 TISSUE EXAM BY PATHOLOGIST: CPT | Performed by: PATHOLOGY

## 2022-02-14 PROCEDURE — 45385 COLONOSCOPY W/LESION REMOVAL: CPT | Performed by: INTERNAL MEDICINE

## 2022-02-14 RX ORDER — PROPOFOL 10 MG/ML
INJECTION, EMULSION INTRAVENOUS CONTINUOUS PRN
Status: DISCONTINUED | OUTPATIENT
Start: 2022-02-14 | End: 2022-02-14

## 2022-02-14 RX ORDER — SODIUM CHLORIDE, SODIUM LACTATE, POTASSIUM CHLORIDE, CALCIUM CHLORIDE 600; 310; 30; 20 MG/100ML; MG/100ML; MG/100ML; MG/100ML
INJECTION, SOLUTION INTRAVENOUS CONTINUOUS PRN
Status: DISCONTINUED | OUTPATIENT
Start: 2022-02-14 | End: 2022-02-14

## 2022-02-14 RX ORDER — PROPOFOL 10 MG/ML
INJECTION, EMULSION INTRAVENOUS AS NEEDED
Status: DISCONTINUED | OUTPATIENT
Start: 2022-02-14 | End: 2022-02-14

## 2022-02-14 RX ORDER — LIDOCAINE HYDROCHLORIDE 10 MG/ML
INJECTION, SOLUTION EPIDURAL; INFILTRATION; INTRACAUDAL; PERINEURAL AS NEEDED
Status: DISCONTINUED | OUTPATIENT
Start: 2022-02-14 | End: 2022-02-14

## 2022-02-14 RX ADMIN — LIDOCAINE HYDROCHLORIDE 50 MG: 10 INJECTION, SOLUTION EPIDURAL; INFILTRATION; INTRACAUDAL at 08:01

## 2022-02-14 RX ADMIN — PROPOFOL 80 MG: 10 INJECTION, EMULSION INTRAVENOUS at 08:01

## 2022-02-14 RX ADMIN — PROPOFOL 80 MCG/KG/MIN: 10 INJECTION, EMULSION INTRAVENOUS at 08:01

## 2022-02-14 RX ADMIN — SODIUM CHLORIDE, SODIUM LACTATE, POTASSIUM CHLORIDE, AND CALCIUM CHLORIDE: .6; .31; .03; .02 INJECTION, SOLUTION INTRAVENOUS at 07:45

## 2022-02-14 NOTE — ANESTHESIA POSTPROCEDURE EVALUATION
Post-Op Assessment Note    CV Status:  Stable  Pain Score: 0    Pain management: adequate     Mental Status:  Alert and awake   Hydration Status:  Euvolemic   PONV Controlled:  Controlled   Airway Patency:  Patent      Post Op Vitals Reviewed: Yes      Staff: CRNA, Anesthesiologist         No complications documented      BP      Temp     Pulse     Resp      SpO2

## 2022-02-14 NOTE — ANESTHESIA PREPROCEDURE EVALUATION
Procedure:  COLONOSCOPY    Relevant Problems   ANESTHESIA (within normal limits)      CARDIO   (+) HTN (hypertension)      ENDO   (-) Diabetes mellitus, type 2 (HCC)   (-) Hyperthyroidism   (-) Hypothyroidism      GI/HEPATIC   (+) Gastroesophageal reflux disease   (+) 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      /RENAL (within normal limits)      HEMATOLOGY (within normal limits)      MUSCULOSKELETAL (within normal limits)      NEURO/PSYCH (within normal limits)      PULMONARY   (-) Asthma   (-) Sleep apnea        Physical Exam    Airway    Mallampati score: III  TM Distance: >3 FB  Neck ROM: full     Dental   No notable dental hx     Cardiovascular  Rhythm: regular, Rate: normal, No murmur,     Pulmonary  Breath sounds clear to auscultation,     Other Findings        Anesthesia Plan  ASA Score- 3     Anesthesia Type- IV sedation with anesthesia with ASA Monitors  Additional Monitors:   Airway Plan:           Plan Factors-Exercise tolerance (METS): >4 METS  Chart reviewed  EKG reviewed  Existing labs reviewed  Patient summary reviewed  Patient is not a current smoker  Induction- intravenous  Postoperative Plan-     Informed Consent- Anesthetic plan and risks discussed with patient  I personally reviewed this patient with the CRNA  Discussed and agreed on the Anesthesia Plan with the CRNA  Kaleb Eduardo

## 2022-02-14 NOTE — H&P
History and Physical -  [facility]  Rissa Case 64 y o  male MRN: [id number]    HPI: Rissa Case is a 64y o  year old male who presents with coloncancer screening  Review of Systems    Historical Information   Past Medical History:   Diagnosis Date    Basal cell carcinoma 10/29/2021    Left helix    Colon polyp     GERD (gastroesophageal reflux disease)     Hernia, internal 01/10/2021    Hypertension      Past Surgical History:   Procedure Laterality Date    ESOPHAGOGASTRODUODENOSCOPY N/A 10/18/2018    Procedure: ESOPHAGOGASTRODUODENOSCOPY (EGD); Surgeon: Dyan Tubbs MD;  Location: [facility]; Service: Gastroenterology    ESOPHAGOGASTRODUODENOSCOPY N/A 1/18/2021    Procedure: ESOPHAGOGASTRODUODENOSCOPY (EGD); Surgeon: Glori Fothergill, MD;  [facility];  Service: Thoracic    HERNIA REPAIR      MOHS SURGERY  11/22/2021    Left helix-Dr Yaneli Velasquez    NO PAST SURGERIES      PARAESOPHAGEAL HERNIA REPAIR N/A 1/18/2021    Procedure: REPAIR HERNIA PARAESOPHAGEAL  LAPAROSCOPIC;  Surgeon: Glori Fothergill, MD;  [facility];  Service: Thoracic    KS COLONOSCOPY FLX DX W/COLLJ SPEC WHEN PFRMD N/A 11/21/2017    Procedure: EGD AND COLONOSCOPY;  Surgeon: Walter Turner MD;  [facility]; Service: Gastroenterology    KS EDG US EXAM SURGICAL ALTER STOM DUODENUM/JEJUNUM N/A 10/18/2018    Procedure: LINEAR ENDOSCOPIC U/S;  Surgeon: Dyan Tubbs MD;  [facility]; Service: Gastroenterology    SKIN BIOPSY       Social History   Social History     Substance and Sexual Activity   Alcohol Use Yes    Alcohol/week: 1 0 standard drink    Types: 1 Glasses of wine per week    Comment: glass of wine/beer a day     Social History     Substance and Sexual Activity   Drug Use No     Social History     Tobacco Use   Smoking Status Never Smoker   Smokeless Tobacco Never Used     No family history on file      Meds/Allergies     (Not in a hospital admission)      Allergies   Allergen Reactions    Penicillin G Other (See Comments)     Too long ago, patient doesn't remember what reaction he had        Objective     /82   Pulse 72   Temp (!) 96 9 °F (36 1 °C) (Temporal)   Resp 18   Ht 6' (1 829 m)   Wt 79 8 kg (176 lb)   SpO2 96%   BMI 23 87 kg/m²       PHYSICAL EXAM    Gen: NAD  CV: RRR  CHEST: Clear  ABD: soft, NT/ND  EXT: no edema  Neuro: AAO      ASSESSMENT/PLAN:  This is a 64y o  year old male here for colon cancer screening  PLAN:   Procedure: Colonoscopy

## (undated) DEVICE — TRAY FOLEY 16FR URIMETER SURESTEP

## (undated) DEVICE — NEEDLE 25G X 1 1/2

## (undated) DEVICE — SUT MONOCRYL 4-0 PS-2 18 IN Y496G

## (undated) DEVICE — TUBING SMOKE EVAC W/FILTRATION DEVICE PLUMEPORT ACTIV

## (undated) DEVICE — 3000CC GUARDIAN II: Brand: GUARDIAN

## (undated) DEVICE — TROCAR: Brand: KII FIOS FIRST ENTRY

## (undated) DEVICE — 3M™ IOBAN™ 2 ANTIMICROBIAL INCISE DRAPE 6650EZ: Brand: IOBAN™ 2

## (undated) DEVICE — CHLORAPREP HI-LITE 26ML ORANGE

## (undated) DEVICE — TROCAR: Brand: KII® SLEEVE

## (undated) DEVICE — PDS II VLT 0 107CM AG ST3: Brand: ENDOLOOP

## (undated) DEVICE — ADHESIVE SKIN HIGH VISCOSITY EXOFIN 1ML

## (undated) DEVICE — GLOVE SRG BIOGEL ECLIPSE 7.5

## (undated) DEVICE — INVIEW CLEAR LEGGINGS: Brand: CONVERTORS

## (undated) DEVICE — TISSUE RETRIEVAL SYSTEM: Brand: INZII RETRIEVAL SYSTEM

## (undated) DEVICE — AIR AND WATER TUBING/CAP SET FOR OLYMPUS® SCOPES: Brand: ERBE

## (undated) DEVICE — ENDOPATH PNEUMONEEDLE INSUFFLATION NEEDLES WITH LUER LOCK CONNECTORS 120MM: Brand: ENDOPATH

## (undated) DEVICE — 2000CC GUARDIAN II: Brand: GUARDIAN

## (undated) DEVICE — GLOVE INDICATOR PI UNDERGLOVE SZ 8 BLUE

## (undated) DEVICE — PENCIL ELECTROSURG E-Z CLEAN -0035H

## (undated) DEVICE — ENDOPATH 5MM CURVED SCISSORS WITH MONOPOLAR CAUTERY: Brand: ENDOPATH

## (undated) DEVICE — DRAPE FLUID WARMER (BIRD BATH)

## (undated) DEVICE — PACK PBDS LAP CHOLE RF

## (undated) DEVICE — TUBING SUCTION 5MM X 12 FT

## (undated) DEVICE — SYRINGE 10ML LL

## (undated) DEVICE — FIRST STEP BEDSIDE KIT - STAND-UP POUCH, ENDOSCOPIC CLEANING PAD - 1 POUCH: Brand: FIRST STEP BEDSIDE KIT - STAND-UP POUCH, ENDOSCOPIC CLEANING PAD

## (undated) DEVICE — SUT VICRYL 0 REEL 54 IN J287G

## (undated) DEVICE — HARMONIC ACE 5MM DIAMETER SHEARS 36CM SHAFT LENGTH + ADAPTIVE TISSUE TECHNOLOGY FOR USE WITH GENERATOR G11: Brand: HARMONIC ACE

## (undated) DEVICE — Device: Brand: DEFENDO AIR/WATER/SUCTION AND BIOPSY VALVE

## (undated) DEVICE — INTENDED FOR TISSUE SEPARATION, AND OTHER PROCEDURES THAT REQUIRE A SHARP SURGICAL BLADE TO PUNCTURE OR CUT.: Brand: BARD-PARKER SAFETY BLADES SIZE 11, STERILE

## (undated) DEVICE — INSUFLATION TUBING INSUFLOW (LEXION)

## (undated) DEVICE — TK® TI-KNOT® DEVICE: Brand: TK® TI-KNOT®

## (undated) DEVICE — TK® QUICK LOAD® UNIT: Brand: TK® QUICK LOAD®

## (undated) DEVICE — Device: Brand: OMNICLOSE TROCAR SITE CLOSURE DEVICE

## (undated) DEVICE — PENROSE DRAIN, 18 X 3 8: Brand: CARDINAL HEALTH